# Patient Record
Sex: FEMALE | URBAN - METROPOLITAN AREA
[De-identification: names, ages, dates, MRNs, and addresses within clinical notes are randomized per-mention and may not be internally consistent; named-entity substitution may affect disease eponyms.]

---

## 2019-02-14 ENCOUNTER — TELEPHONE (OUTPATIENT)
Dept: TRANSPLANT | Facility: CLINIC | Age: 39
End: 2019-02-14

## 2019-02-14 NOTE — TELEPHONE ENCOUNTER
Jo José called and stated that she is interested in becoming a paired living donor for her , Amador José.  Medical and social history obtained.  No contraindications noted, however, patient had a baby 1 month ago. Informed that testing can not begin until she is 6 months postpartum.  All questions answered.  Education book and [facility] information emailed to patient. Instructed to contact me with any questions or when she is ready to begin testing. Patient agreed.

## 2019-05-30 ENCOUNTER — TELEPHONE (OUTPATIENT)
Dept: TRANSPLANT | Facility: CLINIC | Age: 39
End: 2019-05-30

## 2019-05-30 DIAGNOSIS — Z00.5 TRANSPLANT DONOR EVALUATION: Primary | ICD-10-CM

## 2019-05-30 NOTE — TELEPHONE ENCOUNTER
Patient called, would like to scheduled testing for paired donation in July, when her  comes for testing. Required testing discussed and information provided to schedule testing. All questions were answered and patient verbalized understanding.

## 2019-06-11 ENCOUNTER — TELEPHONE (OUTPATIENT)
Dept: TRANSPLANT | Facility: CLINIC | Age: 39
End: 2019-06-11

## 2019-07-18 ENCOUNTER — OFFICE VISIT (OUTPATIENT)
Dept: TRANSPLANT | Facility: CLINIC | Age: 39
End: 2019-07-18
Payer: COMMERCIAL

## 2019-07-18 ENCOUNTER — HOSPITAL ENCOUNTER (OUTPATIENT)
Dept: CARDIOLOGY | Facility: CLINIC | Age: 39
Discharge: HOME | End: 2019-07-18
Payer: COMMERCIAL

## 2019-07-18 VITALS
HEIGHT: 63 IN | RESPIRATION RATE: 18 BRPM | HEART RATE: 64 BPM | TEMPERATURE: 98 F | SYSTOLIC BLOOD PRESSURE: 92 MMHG | OXYGEN SATURATION: 97 % | WEIGHT: 127.44 LBS | BODY MASS INDEX: 22.58 KG/M2 | DIASTOLIC BLOOD PRESSURE: 61 MMHG

## 2019-07-18 DIAGNOSIS — Z00.5 TRANSPLANT DONOR EVALUATION: ICD-10-CM

## 2019-07-18 DIAGNOSIS — Z00.5 TRANSPLANT DONOR EVALUATION: Primary | ICD-10-CM

## 2019-07-18 DIAGNOSIS — O14.93 PRE-ECLAMPSIA IN THIRD TRIMESTER: ICD-10-CM

## 2019-07-18 DIAGNOSIS — Z00.5 WILLING TO BE KIDNEY DONOR: ICD-10-CM

## 2019-07-18 PROCEDURE — 93005 ELECTROCARDIOGRAM TRACING: CPT | Mod: S$GLB,TXP,, | Performed by: INTERNAL MEDICINE

## 2019-07-18 PROCEDURE — 93010 EKG 12-LEAD: ICD-10-PCS | Mod: S$GLB,TXP,, | Performed by: INTERNAL MEDICINE

## 2019-07-18 PROCEDURE — 99204 PR OFFICE/OUTPT VISIT, NEW, LEVL IV, 45-59 MIN: ICD-10-PCS | Mod: S$GLB,TXP,, | Performed by: TRANSPLANT SURGERY

## 2019-07-18 PROCEDURE — 93010 ELECTROCARDIOGRAM REPORT: CPT | Mod: S$GLB,TXP,, | Performed by: INTERNAL MEDICINE

## 2019-07-18 PROCEDURE — 93005 EKG 12-LEAD: ICD-10-PCS | Mod: S$GLB,TXP,, | Performed by: INTERNAL MEDICINE

## 2019-07-18 PROCEDURE — 99205 OFFICE O/P NEW HI 60 MIN: CPT | Mod: S$GLB,TXP,, | Performed by: INTERNAL MEDICINE

## 2019-07-18 PROCEDURE — 3008F BODY MASS INDEX DOCD: CPT | Mod: CPTII,S$GLB,TXP, | Performed by: INTERNAL MEDICINE

## 2019-07-18 PROCEDURE — 99999 PR PBB SHADOW E&M-EST. PATIENT-LVL IV: CPT | Mod: PBBFAC,TXP,, | Performed by: INTERNAL MEDICINE

## 2019-07-18 PROCEDURE — 3008F PR BODY MASS INDEX (BMI) DOCUMENTED: ICD-10-PCS | Mod: CPTII,S$GLB,TXP, | Performed by: INTERNAL MEDICINE

## 2019-07-18 PROCEDURE — 99205 PR OFFICE/OUTPT VISIT, NEW, LEVL V, 60-74 MIN: ICD-10-PCS | Mod: S$GLB,TXP,, | Performed by: INTERNAL MEDICINE

## 2019-07-18 PROCEDURE — 99204 OFFICE O/P NEW MOD 45 MIN: CPT | Mod: S$GLB,TXP,, | Performed by: TRANSPLANT SURGERY

## 2019-07-18 PROCEDURE — 99999 PR PBB SHADOW E&M-EST. PATIENT-LVL IV: ICD-10-PCS | Mod: PBBFAC,TXP,, | Performed by: INTERNAL MEDICINE

## 2019-07-18 NOTE — Clinical Note
July 18, 2019                     [facility] [facility]  [address]  [facility] [address]  Phone: [phone]   Patient: Jo José   MR Number: [id number]   YOB: 1980   Date of Visit: 7/18/2019       Dear      Thank you for referring Jo José to me for evaluation. Attached you will find relevant portions of my assessment and plan of care.    If you have questions, please do not hesitate to call me. I look forward to following Jo José along with you.    Sincerely,    Tami Jimenez MD    Enclosure    If you would like to receive this communication electronically, please contact [email or web address] or [phone] to request [employer] [facility] access.    [employer] [facility] is a tool which provides read-only access to select patient information with whom you have a relationship. Its easy to use and provides real time access to review your patients record including encounter summaries, notes, results, and demographic information.    If you feel you have received this communication in error or would no longer like to receive these types of communications, please e-mail [email or web address]

## 2019-07-18 NOTE — PROGRESS NOTES
Kidney Transplant Donor Evaluation    Subjective:       CC:  Initial evaluation of kidney donor candidacy.    HPI:  Ms. José is a 39 y.o. year old Unknown female who has presented to be evaluated as a potential living unrelated donor for her .  Ms. José reports being here without coercion, payment, guilt or other alternative motives other than wanting to help someone with kidney disease.     She had pre-eclampsia at 34 weeks and she was induced for vaginal delivery at 35 weeks. Denies gestational diabetes. She denies having proteinuria outside of pregnancy. Per patient BP normalized after delivery.     Father diagnosed with HTN and DM diagnosed in his 50s; controlled with diet and exercise. Denies renal disease in family except nephrolithiasis in mother. PGM diagnosed in her 40-50s.     Patient denies any history of coronary artery disease, stroke, seizure disorder, chronic obstructive pulmonary disease, liver disease, kidney stones, gallstones, deep venous thrombosis, pulmonary embolism, recurrent urinary tract infections or malignancies.    Past Medical History:   Past Medical History:   Diagnosis Date    Pre-eclampsia in third trimester     Willing to be kidney donor        Past Surgical History:   History reviewed. No pertinent surgical history.    Medications:   No current outpatient medications on file.     No current facility-administered medications for this visit.        Allergies:   Review of patient's allergies indicates:  No Known Allergies    Social History:  Social History     Socioeconomic History    Marital status:      Spouse name: Not on file    Number of children: Not on file    Years of education: Not on file    Highest education level: Not on file   Occupational History    Not on file   Social Needs    Financial resource strain: Not on file    Food insecurity:     Worry: Not on file     Inability: Not on file    Transportation needs:     Medical: Not on file      Non-medical: Not on file   Tobacco Use    Smoking status: Never Smoker    Smokeless tobacco: Never Used   Substance and Sexual Activity    Alcohol use: Not Currently     Comment: social in the past     Drug use: Never    Sexual activity: Not on file   Lifestyle    Physical activity:     Days per week: Not on file     Minutes per session: Not on file    Stress: Not on file   Relationships    Social connections:     Talks on phone: Not on file     Gets together: Not on file     Attends Spiritism service: Not on file     Active member of club or organization: Not on file     Attends meetings of clubs or organizations: Not on file     Relationship status: Not on file   Other Topics Concern    Not on file   Social History Narrative    Not on file       Family History:   Family History   Problem Relation Age of Onset    Nephrolithiasis Mother     Other Mother         amyloidosis    Diabetes Father         diagnosed in his 50s; controlled with diet and exercise    Hypertension Father         diagnosed in his 50s; controlled with diet and exercise    Hypertension Paternal Grandmother         diagnosed in her 40-50s    Hepatitis Sister         [facility]p [facility]     Lung cancer Maternal Grandfather     No Known Problems Sister     Kidney disease Neg Hx     Colon cancer Neg Hx     Breast cancer Neg Hx        Review of Systems  Constitutional: no fevers, chills, fatigue, loss of appetite, weight gain or loss, night sweats  Eyes: No dryness, redness, blurry vision, loss of vision  Ear, Nose, Throat: No hearing problems, runny nose, mouth dryness, problems swallowing, dental problems  Respiratory: No cough, shortness of breath, sputum, bloody sputum  Cardiovascular: No chest pain or palpitations  Gastrointestinal: no nausea, vomiting, diarrhea, constipation, abdominal pain, blood in stool  Genitourinary: No urinary frequency, urgency,  "incontinence, burning, pain, or bleeding; no flank pain  Skin: no rash or itching  Musculoskeletal: no arthritis or muscle pain  Neurologic: no motor weakness, numbness, tingling, or seizures  Psychiatric: no depression, mood swings, eda, or anxiety  Hematologic: +anemia during pregnancy; no easy bleeding or bruising, or blood clots  Endocrine: no thyroid problems, hot or cold intolerance, or diabetes  Immunologic: no chronic infection, hay fever, eczema    Functional History  Jo José is able to climb a flight of stairs, walk a mile, jog, and play recreational sports without any limitation or difficulty.    Health Care Maintenance  Routine follow up with PCP: doesn't have a PCP currently; saw Dr. Giselle Torres about 2 weeks ago in [place]    For Women:  Last Physical Exam: July 2018  Last Colonoscopy: N/A  Last Pap smear: 10/23/17  Last Mammogram: N/A  Last Menstrual period: May 2018 - hasn't had period return after delivery in Jan 2019  G 1 P 1    Are you pregnant or plan on becoming pregnant within the next year? no - states she is 99.9% sure she will not be having more children  How many previous pregnancies have you had? one  Have you ever had a miscarriage?   no  Have you had any complications during the pregnancy? pre-eclampsia  Any history of diabetes, hypertension, preeclampsia, or protein in the urine while pregnant? Yes - pre-eclampsia during third trimester    Objective:   Physical Exam   /62 (BP Location: Right arm, Patient Position: Sitting, BP Method: Medium (Automatic))   Pulse 71   Temp 98.2 °F (36.8 °C) (Oral)   Resp 18   Ht 5' 2.99" (1.6 m)   Wt 57.8 kg (127 lb 6.8 oz)   SpO2 97%   BMI 22.58 kg/m²     General: No acute distress, well groomed, alert and oriented x 3  HEENT: Normocephalic, atraumatic, PERRLA, sclera anicteric, conjunctiva/corneas clear, EOM's intact bilaterally, external inspection of ears and nose normal, moist mucous membranes, no oral ulcerations/lesions   Neck: " Supple, symmetrical, trachea midline, no masses, no carotid bruits, no JVD, thyroid is normal without nodules or enlargement   Respiratory: Clear to auscultation bilaterally, respirations unlabored, no rales/rhonchi/wheezing   Cardiovacular: Regular rate and rhythm, S1, S2 normal, no murmurs, no rubs or gallops   Gastrointestinal: Soft, non-tender, bowel sounds normal, no masses, no palpable organomegaly  Extremities: No clubbing or cyanosis of upper extremities bilaterally, no pedal edema bilaterally; +2 bilateral radial pulses  Skin: warm and dry; no rash on exposed skin  Lymph nodes: Cervical and supraclavicular nodes normal   Neurologic: No focal neurologic deficits.   Musculoskeletal: moves all extremities without difficulty, FROM, 5/5 strength, ambulates without an assistive device  Psychiatric: Normal mood and affect. Responds appropriately to questions.      Labs:  7/18/2019: Creatinine 0.8 mg/dL (Ref range: 0.5 - 1.4 mg/dL); BUN, Bld 17 mg/dL (Ref range: 6 - 20 mg/dL)       Assessment:     1. Transplant donor evaluation    2. Willing to be kidney donor    3. Pre-eclampsia in third trimester        Plan:   Donor Candidacy:   Based on the given information, Ms. José appears to be a suitable candidate for kidney donation.  A final recommendation will be made by the selection committee after reviewing her complete workup.    Counseled patient regarding increased risk of pre-eclampsia but she states she is 99.9% sure she will not be having more children.     Tami Jimenez MD       Counseling:   I discussed with Ms. José that donation is voluntary and reiterated it should be done willingly and for altruistic reasons only.  I reviewed that no payment should be received for donating.  I also discussed that coercion, guilt, pressure, or feelings of obligation are not appropriate reasons to donate.  The option to withdraw at any time was emphasized.  Ms. José was reminded that a medical opt out can be given to  protect her confidentiality, and no member of the transplant team will discuss specifics of her health or medical/social history with anyone else without permission.  The need for lifelong routine medical follow-up for optimal health, including routine health maintenance was reviewed.    We also discussed the long term risks associated with kidney donation.  I told the patient that her GFR should return to within 75-80% of pre-donation level within six months of donation.  I informed the patient that there is a small risk of developing albuminuria and hypertension following donor nephrectomy.  I also informed the patient that based on current literature, the risk of developing end-stage renal disease following donor nephrectomy is similar to the general population.    I reviewed with Ms. José available lab results and other diagnostics from the evaluation process    Additional Counseling:   The patient was counseled on the need for regular follow-up with a primary care physician for blood pressure and cholesterol screening.  The importance of age appropriate health screening was also emphasized.    Follow-up:   As per protocol    Altogether, 40 minutes of this encounter were spent on counseling, which was greater than 50% of the total visit time (50 minutes).

## 2019-07-18 NOTE — PROGRESS NOTES
Transplant Surgery Kidney Donor Evaluation     Referring Physician:     Subjective:     Chief Complaint: Jo José is a 39 y.o. year old female who presents today wishing to be evaluated as a potential living unrelated donor for her .    History of Present Illness:  Jo reports being here without coercion, payment, guilt, or other alternative motives other than wanting to help someone with kidney disease.    Review of Systems   Constitutional: Negative for activity change and chills.   HENT: Negative for congestion and sore throat.    Eyes: Negative for discharge and redness.   Respiratory: Negative for cough and shortness of breath.    Cardiovascular: Negative for chest pain and palpitations.   Gastrointestinal: Negative for nausea and vomiting.   Endocrine: Negative for polydipsia and polyuria.   Genitourinary: Negative for dysuria and frequency.   Musculoskeletal: Negative for arthralgias and gait problem.   Skin: Negative for pallor and rash.   Neurological: Negative for dizziness and light-headedness.   Hematological: Negative for adenopathy. Does not bruise/bleed easily.   Psychiatric/Behavioral: Negative for agitation and suicidal ideas.       Objective:   Physical Exam   Constitutional: She is oriented to person, place, and time. She appears well-developed and well-nourished. No distress.   Neck: Normal range of motion. Neck supple. No JVD present.   Cardiovascular: Normal rate and intact distal pulses.   Pulmonary/Chest: Effort normal. No respiratory distress.   Abdominal: Soft. She exhibits no distension and no mass. There is no tenderness. There is no rebound and no guarding.   Musculoskeletal: She exhibits no edema.   Neurological: She is alert and oriented to person, place, and time.   Skin: Skin is warm and dry. She is not diaphoretic.   Psychiatric: She has a normal mood and affect.     ABO type:     Diagnoses:  1. Transplant donor evaluation             Transplant Surgery - Candidacy    Assessment/Plan:     Donor Candidacy: Based on information available thus far, Jo is a suitable candidate for living kidney donation.    Laureano Cox MD       Education: I discussed with the patient the requirements for donation including the compatibility of blood and tissue typing, healthy by physical examination and workup, as well as the desire to donate.  We discussed the risks related to surgery including the risks related to anesthesia, bleeding, infection, inability for surgery to be performed laparoscopically, risks of reoperation as well as the risks of death.  We discussed the length of hospitalization, return to work times, as well as follow-up post-donation.    I also discussed the slight possibility that due to problems with the recipient operation, the transplant might not be able to be completed after the organ was already removed. If such a situation should arise, the donor prefers that the organ be transplanted into a suitable third-party recipient.    I discussed the possibility that living donor sometimes encounter problems obtaining health insurance, or could have higher premium despite ongoing efforts of transplant professionals to educate insurance companies on this issue.    I discussed with Jo that donation is a voluntary activity, and reiterated it should be done willingly and for altruistic reasons only.  I reviewed that no payment should be made for donating.  I also discussed that coersion, guilt, pressure, or feelings of obligation are not appropriate reasons to donate.  The option to withdraw at any time was emphasized.  Jo was reminded that a medical opt out can be given to protect her confidentiality, and no member of the transplant team will discuss specifics of her health or medical/social history with anyone else without permission.  The need for lifelong routine medical follow-up for optimal health, including routine health maintenance was reviewed.    Additionally, I  discussed the need for our program to be able to contact living donors for UNOS reporting purposes for a minimum of 2 years.  Failure of our center to be able to provide such information could jeopardize our ability to continue to offer living donor transplants.  Jo voices understanding and agrees to this follow-up.    I discussed the UNOS requirement for centers to report donor status for a minimum of two years. Jo understands that failure to comply with requirement could have adverse consequences for our transplant program, and agrees to cooperate with all our required follow-up.    I reviewed with Jo available lab results and other diagnostics from the evaluation process.

## 2019-07-18 NOTE — PROGRESS NOTES
Pt is here today for kidney donor evaluation.  Labs, hx, and BMI reviewed.  No nutrition intervention required at this time.

## 2019-07-18 NOTE — PROGRESS NOTES
"DONOR TEACHING NOTE    Met with Jo José today in clinic to review living donor education.        Topics covered included:  · Kidney donation is done voluntarily and of the donor's free will.  Process can stop at any time.   · Better success rates than cadaveric donation, shorter waiting time for the recipient: less than the 3-5 year wait on the list, more time to prepare: tests/surgery can be planned  · Laboratory studies of blood and urine.  Health exams: records of GYN/pap/mammogram (females); evaluation by transplant team and a psychiatrist (if indicated); diagnostic Tests: CXR, EKG, TB skin test, 24-hour urine collection, renal scan, CT of abdomen to assess kidney anatomy, and stress test (if indicated)  · Team will review workup for approval.  Copy of the approved criteria for donation given to patient.  Surgeon will decide which kidney will be donated.   · Benefits of laparoscopic nephrectomy: less pain, shorter hospital stay, a shorter recovery period.  Risks discussed: bleeding, deep vein thrombosis, pulmonary embolus, the need for re-operation.  Your operation may be converted to an "open" procedure if the surgeon feels it is medically necessary.  · To recovery room, transfer to TSU, if space allows or semi-private room.  Menendez catheter/IV, average hospital stay is 1 day.  At discharge the cost of any prescriptions is the donor's responsibility.  · Post-operative visit 4 weeks after surgery or prior to returning to work, unless a complication arises and you need to be seen sooner.  Off of work for about 3 to 6 weeks, no driving for at least the first 3 weeks.  General surgical complications: infection, allergic reaction, and anesthesia.   · Long life considerations of living with one kidney: risk of HTN and kidney failure   · Following up with PCP 6 months after donation then yearly thereafter to monitor kidney function.  This should include weight, labs, urinalysis, and a blood pressure check.  We are " required to report your progress to UNOS at 6 months, 1 year, and 2 years post-donation.     Written educational material provided. Informed consent reviewed and signed, including paired kidney donation. All questions were answered and patient verbalized understanding.     Patient is a suitable candidate for living kidney donation.

## 2019-07-18 NOTE — PROGRESS NOTES
TRANSPLANT DONOR PSYCHOSOCIAL ASSESSMENT    Jo José  [address] [place] [address]  Telephone Information:   Mobile [phone]     Home [phone] (home)  Work  There is no work phone number on file.  E-mail  [email or web address]    Pt has currently been staying with family in [facility] since the birth of her baby 6 months ago.   (intended recipient) is working in [place].    PHS Increased Risk Behavioral Questionnaire reviewed by : Yes   addressed any PHS increased risk behaviors or concerns. Resources and education provided as appropriate.    Sex: female  YOB: 1980  Age: 39 y.o.    Encounter Date: 7/18/2019  U.[place]. Citizen: no  Primary Language: English   Needed: no    Potential Recipient: Amador José  Clinic Number: [id number]  Donor's Relationship to Patient: spouse    Emergency Contact:  Name: Berry Chad  Relationship: parents  Address: [facility]  Phone Numbers:  n/a (home), n/a (work), [phone] (dad), [phone] (mom) (mobile)    Family/Social Support:   Number of dependents/: one six month old son  Marital history:  once for the past 7 years  Other family dynamics: Parents living and supportive; two sisters, all of whom live in [place]    Household Composition:  Name: Felix José  Age: 39 and 42  Relationship: patient and   Does person drive? yes    Name: Amador Bonilla Arnav  Age: 6 mos  Relationship: son  Does person drive? no    Do you and your caregivers have access to reliable transportation? yes  PRIMARY CAREGIVER: Janneth marii Barrie Bonilla, parents will be primary caregiver, phone number [phone].  Mother, Janneth, does not drive.      provided in-depth information to patient and caregiver regarding pre- and post-transplant caregiver role.   strongly encourages patient and caregiver to have concrete plan regarding  post-transplant care giving, including back-up caregiver(s) to ensure care giving needs are met as needed.    Patient states understanding all aspects of caregiver role/commitment and is able/willing/committed to being caregiver to the fullest extent necessary.    Patient verbalizes understanding of the education provided today and caregiver responsibilities.         remains available. Patient agree to contact  in a timely manner if concerns arise.      Able to take time off work without financial concerns: yes. 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      Additional Significant Others who will Assist with Transplant:  Name: Amber José  Age: 71  City: [place] State: [place]  Relationship: mother in law  Does person drive? yes      Living Will: no  Healthcare Power of : no  Advance Directives on file: <no information> per medical record.  Verbally reviewed LW/HCPA information.   provided patient with copy of LW/HCPA documents and provided education on completion of forms.    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    Fundraising and NLDAC information provided to patient.  Patient verbalizes understanding.   remains available.    Spouse/Significant Other Employment: Pt's  is an MD and practices Emergency Medicine currently in [place].    Insurance: see potential recipient's insurance for donor coverage.    Does the donor have health insurance? Yes    Patient verbalizes clear understanding that patient may experience difficulty obtaining and/or be denied insurance coverages post-surgery.  This includes and is not limited to disability insurance, life insurance, health insurance, burial insurance, long term care insurance, and other  insurances.  Patient also reports understanding that future health concerns related to or unrelated to transplantation may not be covered by patient's insurance.  Resources and information provided and reviewed.      Patient provides verbal permission to release any necessary information to outside resources for patient care and discharge planning.  Resources and information provided and reviewed.      Infusion Service: patient utilizing? no  Home Health: patient utilizing? no  DME: no  ADLS:  Pt states ability to independently accomplish all adls.    Adherence:   Pt states current and expected compliance with all healthcare recommendations.   Adherence education and counseling provided    Per History Section:  Past Medical History:   Diagnosis Date    Pre-eclampsia in third trimester     Willing to be kidney donor      Social History     Tobacco Use    Smoking status: Never Smoker    Smokeless tobacco: Never Used   Substance Use Topics    Alcohol use: Not Currently     Comment: social in the past      Social History     Substance and Sexual Activity   Drug Use Never     Social History     Substance and Sexual Activity   Sexual Activity Not on file       Per Today's Psychosocial:  Tobacco: none, patient denies any use.  Alcohol: none, patient denies any use.  Illicit Drugs/Non-prescribed Medications: none, patient denies any use.    Patient states clear understanding of the potential impact of substance use as it relates to donor candidacy and is agreeable to random substance screening.  Substance abstinence/cessation counseling, education and resources provided and reviewed.     Arrests/DWI/Treatment/Rehab: patient denies    Psychiatric History:    Mental Health: Pt denies mental health concerns  Psychiatrist/Counselor: none  Medications:  none  Suicide/Homicide Issues: Pt denies current or past suicidal/homicidal ideation.   Safety at home: Pt denies any home safety concerns.    Donation  Knowledge/Expectations: Patient states having clear understanding and realistic expectations regarding the potential risks and potential benefits of organ transplantation and organ donation and agrees to discuss with health care team members and support system members, as well as to utilize available resources and express questions and/or concerns in order to further facilitate the patients informed decision-making.  Resources and information provided and reviewed. .    Decision-making Process: Pt stated she (naturally) would like to keep her  healthy and off of dialysis if possible and knows that donation will increase his chances of being transplanted before he has to start dialysis.  She stated he has been listed two years and his CKD has progressed to stage 5.  Even though he has no access yet, wife stated she would like to get pt transplanted sooner than that so that he can be there for her and their son.  She wants to be in the paired organ exchange program as she is not a match.  Patient states understanding that transplant and donation are not a guarantee that the donated organ will function.  Patient states understanding of kidney treatment options available for kidney patients, psychosocial aspects surrounding organ donation and transplantation as well as recovery.  Patient also states clear understanding that patient may choose to not donate at any time prior to surgery taking place, and that patient confidentiality is protected.  Patient reports expected compliance with health care regime and states understanding of importance of compliance.  Educational information provided.    Patient reports having a clear understanding  that risks and benefits may be involved with organ transplantation and with organ donation and  of the treatment options available to a potential transplant recipient. Patient reports clear understanding that psychosocial risk factors which may affect patient, including but  not limited to feelings of depression, generalized anxiety, anxiety regarding dependence on others, post traumatic stress disorder, feelings of guilt and other emotional and/or mental concerns, and/or exacerbation of existing mental health concerns.     Detailed resources and education provided and discussed. Patient agrees to access appropriate resources in a timely manner as needed and to communicate concerns appropriately.      Feelings or Concerns: Pt verbalizes no concerns at this time. Patient denies feelings of coercion, pressure or obligation to donate.  She states she is placing no undue pressure on herself to donate.  Patient states that patient is not receiving any compensation for organ donation. Patient reports motivation to pursue organ donation at this time.     Patient reports having clear and realistic expectations and understanding of the many psychosocial aspects involved with being a living organ donor, including but not limited to costs, compliance, medications, lab work, procedures, appointments, financial planning, preparedness, timely and appropriate communication of concerns, abstinence from non-prescribed drugs or substances, importance of adherence to and follow-through with all health care team recommendations, participation in health care and treatment planning, utilization of resources and follow-through, mental health counseling as needed and/or recommended, and the patient and caregiver responsibilities.  Patient states having a clear understanding of possible difficulty obtaining or possibility of being denied insurance coverage post-surgery.  This includes and is not limited to disability insurance, life insurance, health insurance, burial insurance, long-term care insurance and other insurances.  Educational and resource information provided and reviewed.  Patient also reports understanding that future health concerns related to or unrelated to organ donation may not be covered by  patients insurance.    Coping:  Pt stated she sarath through spending time with family and new baby, going out to dinner and theater as well as traveling with .     Interview Behavior: Jo presents as alert and oriented x 4, pleasant, good eye contact, well groomed, recall good, concentration/judgement good, average intelligence, calm, communicative, cooperative and asking and answering questions appropriately.  Pt presented as very well informed and open to all education.  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  She presented with  (also intended recipient) who exited the room for sensitive areas of interview.  Pt's caregivers are her parents who remained in [place], but will be available for pt when surgery is scheduled.      Suitability for Donation: Jo José presents as an acceptable candidate for donation at this time.    Recommendations/Additional Comments: Pt and  are currently staying at a local [facility] and plan to utilize same when transplant takes place.  She stated they are able to afford costs associated with transplant.  They have childcare arranged with family and care for pets as well.  No further needs identified at this time.

## 2019-07-18 NOTE — PATIENT INSTRUCTIONS
1. Avoid excess use of ibuprofen, naproxen, Motrin (NSAIDs), protein consumption, iodinated contrast dye  2. Stay active   3. Follow-up with your PCP

## 2019-07-19 ENCOUNTER — HOSPITAL ENCOUNTER (OUTPATIENT)
Dept: RADIOLOGY | Facility: HOSPITAL | Age: 39
Discharge: HOME | End: 2019-07-19
Attending: NURSE PRACTITIONER
Payer: COMMERCIAL

## 2019-07-19 ENCOUNTER — HOSPITAL ENCOUNTER (OUTPATIENT)
Dept: RADIOLOGY | Facility: HOSPITAL | Age: 39
Discharge: HOME | End: 2019-07-19
Attending: TRANSPLANT SURGERY
Payer: COMMERCIAL

## 2019-07-19 DIAGNOSIS — Z00.5 TRANSPLANT DONOR EVALUATION: ICD-10-CM

## 2019-07-19 PROCEDURE — 74174 CTA ABD&PLVS W/CONTRAST: CPT | Mod: 26,TXP,, | Performed by: RADIOLOGY

## 2019-07-19 PROCEDURE — 74174 CTA ABD&PLVS W/CONTRAST: CPT | Mod: TC,TXP

## 2019-07-19 PROCEDURE — 74174 CTA ABDOMEN AND PELVIS: ICD-10-PCS | Mod: 26,TXP,, | Performed by: RADIOLOGY

## 2019-07-19 PROCEDURE — 71046 XR CHEST PA AND LATERAL: ICD-10-PCS | Mod: 26,TXP,, | Performed by: RADIOLOGY

## 2019-07-19 PROCEDURE — 71046 X-RAY EXAM CHEST 2 VIEWS: CPT | Mod: 26,TXP,, | Performed by: RADIOLOGY

## 2019-07-19 PROCEDURE — 71046 X-RAY EXAM CHEST 2 VIEWS: CPT | Mod: TC,TXP

## 2019-07-19 PROCEDURE — 25500020 PHARM REV CODE 255: Mod: TXP | Performed by: TRANSPLANT SURGERY

## 2019-07-19 RX ADMIN — IOHEXOL 125 ML: 350 INJECTION, SOLUTION INTRAVENOUS at 01:07

## 2019-07-31 ENCOUNTER — TELEPHONE (OUTPATIENT)
Dept: TRANSPLANT | Facility: CLINIC | Age: 39
End: 2019-07-31

## 2019-10-07 ENCOUNTER — TELEPHONE (OUTPATIENT)
Dept: TRANSPLANT | Facility: CLINIC | Age: 39
End: 2019-10-07

## 2019-10-07 NOTE — TELEPHONE ENCOUNTER
Patient emailed that she is back stateside. Requesting supplies for repeat urine testing be sent to new address. Supplies sent with lab order.

## 2019-11-23 LAB
ALBUMIN/CREAT UR: <21.6 MG/G CREAT (ref 0–30)
APPEARANCE UR: CLEAR
BACTERIA UR CULT: NO GROWTH
BACTERIA UR CULT: NORMAL
BILIRUB UR QL STRIP: NEGATIVE
COLOR UR: YELLOW
CREAT 24H UR-MRATE: 445 MG/24 HR (ref 800–1800)
CREAT CL 24H UR+SERPL-VRATE: 40 ML/MIN (ref 88–128)
CREAT SERPL-MCNC: 0.77 MG/DL (ref 0.57–1)
CREAT UR-MCNC: 13.9 MG/DL
GLUCOSE UR QL: NEGATIVE
H CAPSUL AG UR QL IA: <0.5
HGB UR QL STRIP: NEGATIVE
KETONES UR QL STRIP: NEGATIVE
LEUKOCYTE ESTERASE UR QL STRIP: NEGATIVE
Lab: NORMAL
MICRO URNS: NORMAL
MICROALBUMIN UR-MCNC: <3 UG/ML
NITRITE UR QL STRIP: NEGATIVE
PH UR STRIP: 6 [PH] (ref 5–7.5)
PROT 24H UR-MRATE: 198 MG/24 HR (ref 30–150)
PROT UR QL STRIP: NEGATIVE
PROT UR-MCNC: 6.2 MG/DL
SP GR UR: 1.01 (ref 1–1.03)
UROBILINOGEN UR STRIP-MCNC: 0.2 MG/DL (ref 0.2–1)

## 2019-12-05 ENCOUNTER — TELEPHONE (OUTPATIENT)
Dept: TRANSPLANT | Facility: CLINIC | Age: 39
End: 2019-12-05

## 2019-12-05 NOTE — TELEPHONE ENCOUNTER
Results of repeat urine testing reviewed with Dr. Luis. Will repeat 24 hour urine collection due to increased protein and obtain a cystatin C.     Patient notified of test results, states she was not having her menstrual cycle and did not exercise heavily prior to last testing. Orders for repeat testing emailed to patient. She will obtain supplies locally.   Reviewed proper collection technique for 24 hour urine and CCMS samples. Patient instructed to assure specimen containers labeled correctly with their information prior to beginning collection. All questions were answered and patient verbalized understanding.

## 2019-12-21 LAB
PROT 24H UR-MRATE: 83 MG/24 HR (ref 30–150)
PROT UR-MCNC: 5.5 MG/DL

## 2019-12-23 NOTE — PROGRESS NOTES
I see two different collections both with unacceptable CrCl.  Let's review this with the clinic nephrologist in [place]

## 2019-12-24 LAB
ALBUMIN/CREAT UR: 7.3 MG/G CREAT (ref 0–30)
CREAT SERPL-MCNC: 0.68 MG/DL (ref 0.57–1)
CREAT UR-MCNC: 71 MG/DL
CYSTATIN C SERPL-MCNC: 0.75 MG/L (ref 0.62–1.16)
MICROALBUMIN UR-MCNC: 5.2 UG/ML

## 2020-02-06 ENCOUNTER — DOCUMENTATION ONLY (OUTPATIENT)
Dept: TRANSPLANT | Facility: CLINIC | Age: 40
End: 2020-02-06

## 2020-02-06 NOTE — NURSING
PHARMEmilyD. PRE-TRANSPLANT DONOR NOTE:    This patient's medication therapy was evaluated as part of her pre-transplant evaluation.      The following general pharmacologic concerns were noted: none    No current outpatient medications on file.     No current facility-administered medications for this visit.          Currently she is responsible for preparing / administering this patient's medications on a daily basis.  I am available for consultation and can be contacted, as needed by the other members of the Kidney Transplant team.

## 2020-02-07 ENCOUNTER — COMMITTEE REVIEW (OUTPATIENT)
Dept: TRANSPLANT | Facility: CLINIC | Age: 40
End: 2020-02-07

## 2020-02-07 DIAGNOSIS — Z00.5 TRANSPLANT DONOR EVALUATION: Primary | ICD-10-CM

## 2020-02-07 NOTE — COMMITTEE REVIEW
Jo José was presented at selection committee on 2/7/20 . Patient did meet selection criteria for living kidney donation pending nuclear medicine GFR to confirm renal function.     CT scan reviewed, will use left kidney for donation.    Attempted to contact patient, no answer, no voicemail. E mail sent to patient to call for committee decision.     Note written by Audrey Carvajal RN    ===============================================================  I was present at the meeting and attest to the decision of the committee

## 2020-02-10 ENCOUNTER — TELEPHONE (OUTPATIENT)
Dept: TRANSPLANT | Facility: CLINIC | Age: 40
End: 2020-02-10

## 2020-02-10 NOTE — TELEPHONE ENCOUNTER
Patient called, informed of committee decision. Patient will contact me to schedule nuclear medicine GFR after checking her schedule. Also asked patient to have recent gyn records faxed to us for review.

## 2020-02-20 ENCOUNTER — HOSPITAL ENCOUNTER (OUTPATIENT)
Dept: RADIOLOGY | Facility: HOSPITAL | Age: 40
Discharge: HOME | End: 2020-02-20
Attending: INTERNAL MEDICINE
Payer: COMMERCIAL

## 2020-02-20 DIAGNOSIS — Z00.5 TRANSPLANT DONOR EVALUATION: ICD-10-CM

## 2020-02-20 PROCEDURE — 78701 KIDNEY IMAGING WITH FLOW: CPT | Mod: 26,TXP,, | Performed by: RADIOLOGY

## 2020-02-20 PROCEDURE — 78701 NM KIDNEY IMAGING MORPH W VASCULAR: ICD-10-PCS | Mod: 26,TXP,, | Performed by: RADIOLOGY

## 2020-02-20 PROCEDURE — A9567 TECHNETIUM TC-99M AEROSOL: HCPCS | Mod: TXP

## 2020-02-20 PROCEDURE — 78701 KIDNEY IMAGING WITH FLOW: CPT | Mod: TC,TXP

## 2020-02-21 ENCOUNTER — COMMITTEE REVIEW (OUTPATIENT)
Dept: TRANSPLANT | Facility: CLINIC | Age: 40
End: 2020-02-21

## 2020-02-21 NOTE — COMMITTEE REVIEW
Results of recent nuclear medicine GFR were reviewed at selection committee meeting today. Although patient's GFR does meet selection criteria for living kidney donation, it is slightly low for patient's age and small amount of proteinuria is present. Dr. Jimenez will review the results with the patient and discuss future risks. If acceptable with patient, we will proceed with paired exchange.     This writer contacted the patient and discussed results of scaled nuclear GFR and the discussion held at selection committee. Explained that typically 80 mL/min is the cut off for donor candidacy but this is weighed against the potential donor's age and other medical history. Her scaled nuclear GFR for BSA was 87.27 mL/min but she is only 39 years old at this time. She does have a small body frame thus there was concern regarding inaccuracy of CrCl as measured by 24 hour urine collection. Cystatin C GFR was acceptable. Patient voiced that she understands the results of the testing and the risks of donation and still wants to proceed. She stated that she is not planning on having any more children. Counseled her and her  regarding how they should still seek other potential living donors who may potentially be able to donate directly to the recipient. Also explained that due to enrolling into paired exchange program the other transplant center may look at donor-recipient size matching to optimize chances of successful transplant outcome which may lead to a lengthier time in the paired exchange process. She thanked this writer for lora the time to go over the results and answer their questions.       Note written by Audrey Carvajal RN    ===============================================================  I was present at the meeting and attest to the decision of the committee. My additional comments are bolded above.    Tami Jimenez MD

## 2020-02-28 ENCOUNTER — COMMITTEE REVIEW (OUTPATIENT)
Dept: TRANSPLANT | Facility: CLINIC | Age: 40
End: 2020-02-28

## 2020-02-28 NOTE — COMMITTEE REVIEW
Jo José was discussed at selection committee this morning. Dr Jimenez reviewed conversation with patient. Patient understands risks and would like to proceed with donation. Patient approved for living kidney donation.    Patient notified of committee decision. Informed that we will enter them into the paired exchange program and they will be notified when they are matched. All questions were answered and patient verbalized understanding.     Note written by Audrey Carvajal RN    ===============================================================  I was present at the meeting and attest to the decision of the committee

## 2020-03-25 ENCOUNTER — TELEPHONE (OUTPATIENT)
Dept: TRANSPLANT | Facility: CLINIC | Age: 40
End: 2020-03-25

## 2020-03-25 NOTE — TELEPHONE ENCOUNTER
----- Message from Clarke Lopez MD sent at 3/25/2020  9:01 AM CDT -----  Regarding: RE: donor CT  Left donor  ----- Message -----  From: Audrey Carvajal  Sent: 2/28/2020  11:18 AM CDT  To: Clarke Lopez MD  Subject: donor CT                                         Review donor CT

## 2020-08-26 ENCOUNTER — TELEPHONE (OUTPATIENT)
Dept: TRANSPLANT | Facility: CLINIC | Age: 40
End: 2020-08-26

## 2020-08-26 NOTE — TELEPHONE ENCOUNTER
Spoke with pt. She is able to proceed with surgery in the end of September; beginning of October time frame. Informed her that she will receive a CXM kit in the mail from the recipient center in [place] and to notify us once she receives it.   Encouraged to call for any further questions or concerns.

## 2020-09-08 ENCOUNTER — TELEPHONE (OUTPATIENT)
Dept: TRANSPLANT | Facility: CLINIC | Age: 40
End: 2020-09-08

## 2020-09-14 ENCOUNTER — TELEPHONE (OUTPATIENT)
Dept: TRANSPLANT | Facility: CLINIC | Age: 40
End: 2020-09-14

## 2020-09-15 DIAGNOSIS — Z00.5 TRANSPLANT DONOR EVALUATION: Primary | ICD-10-CM

## 2020-09-15 DIAGNOSIS — Z00.5 EXAMINATION OF POTENTIAL DONOR OF ORGAN AND TISSUE: ICD-10-CM

## 2020-09-15 DIAGNOSIS — Z01.818 PRE-OP EVALUATION: ICD-10-CM

## 2020-09-22 ENCOUNTER — CLINICAL SUPPORT (OUTPATIENT)
Dept: TRANSPLANT | Facility: CLINIC | Age: 40
End: 2020-09-22
Payer: COMMERCIAL

## 2020-09-22 ENCOUNTER — TELEPHONE (OUTPATIENT)
Dept: PREADMISSION TESTING | Facility: HOSPITAL | Age: 40
End: 2020-09-22

## 2020-09-22 ENCOUNTER — OFFICE VISIT (OUTPATIENT)
Dept: TRANSPLANT | Facility: CLINIC | Age: 40
End: 2020-09-22
Payer: COMMERCIAL

## 2020-09-22 ENCOUNTER — CLINICAL SUPPORT (OUTPATIENT)
Dept: TRANSPLANT | Facility: CLINIC | Age: 40
End: 2020-09-22

## 2020-09-22 ENCOUNTER — HOSPITAL ENCOUNTER (OUTPATIENT)
Dept: RADIOLOGY | Facility: HOSPITAL | Age: 40
Discharge: HOME | End: 2020-09-22
Attending: NURSE PRACTITIONER
Payer: COMMERCIAL

## 2020-09-22 ENCOUNTER — HOSPITAL ENCOUNTER (OUTPATIENT)
Dept: CARDIOLOGY | Facility: CLINIC | Age: 40
Discharge: HOME | End: 2020-09-22
Payer: COMMERCIAL

## 2020-09-22 VITALS
HEART RATE: 72 BPM | BODY MASS INDEX: 25.27 KG/M2 | TEMPERATURE: 97 F | HEART RATE: 72 BPM | SYSTOLIC BLOOD PRESSURE: 116 MMHG | WEIGHT: 142.63 LBS | HEIGHT: 63 IN | HEIGHT: 63 IN | BODY MASS INDEX: 25.27 KG/M2 | WEIGHT: 142.63 LBS | OXYGEN SATURATION: 97 % | TEMPERATURE: 97 F | OXYGEN SATURATION: 97 % | OXYGEN SATURATION: 97 % | DIASTOLIC BLOOD PRESSURE: 76 MMHG | DIASTOLIC BLOOD PRESSURE: 76 MMHG | TEMPERATURE: 97 F | BODY MASS INDEX: 25.27 KG/M2 | RESPIRATION RATE: 18 BRPM | SYSTOLIC BLOOD PRESSURE: 116 MMHG | HEIGHT: 63 IN | WEIGHT: 142.63 LBS | RESPIRATION RATE: 18 BRPM | RESPIRATION RATE: 18 BRPM | HEART RATE: 72 BPM | SYSTOLIC BLOOD PRESSURE: 116 MMHG | DIASTOLIC BLOOD PRESSURE: 76 MMHG

## 2020-09-22 VITALS
BODY MASS INDEX: 25.27 KG/M2 | SYSTOLIC BLOOD PRESSURE: 116 MMHG | WEIGHT: 142.63 LBS | HEART RATE: 72 BPM | TEMPERATURE: 97 F | OXYGEN SATURATION: 97 % | DIASTOLIC BLOOD PRESSURE: 76 MMHG | HEIGHT: 63 IN | RESPIRATION RATE: 18 BRPM

## 2020-09-22 DIAGNOSIS — Z52.4 DONOR OF KIDNEY FOR TRANSPLANT: ICD-10-CM

## 2020-09-22 DIAGNOSIS — Z00.5 TRANSPLANT DONOR EVALUATION: ICD-10-CM

## 2020-09-22 DIAGNOSIS — Z00.5 WILLING TO BE KIDNEY DONOR: Primary | ICD-10-CM

## 2020-09-22 PROCEDURE — 3008F PR BODY MASS INDEX (BMI) DOCUMENTED: ICD-10-PCS | Mod: CPTII,S$GLB,TXP, | Performed by: NURSE PRACTITIONER

## 2020-09-22 PROCEDURE — 93010 ELECTROCARDIOGRAM REPORT: CPT | Mod: S$GLB,TXP,, | Performed by: INTERNAL MEDICINE

## 2020-09-22 PROCEDURE — 3008F BODY MASS INDEX DOCD: CPT | Mod: CPTII,S$GLB,TXP,ICN | Performed by: TRANSPLANT SURGERY

## 2020-09-22 PROCEDURE — 93010 EKG 12-LEAD: ICD-10-PCS | Mod: S$GLB,TXP,, | Performed by: INTERNAL MEDICINE

## 2020-09-22 PROCEDURE — 99214 OFFICE O/P EST MOD 30 MIN: CPT | Mod: S$GLB,TXP,ICN, | Performed by: TRANSPLANT SURGERY

## 2020-09-22 PROCEDURE — 71046 X-RAY EXAM CHEST 2 VIEWS: CPT | Mod: 26,TXP,, | Performed by: RADIOLOGY

## 2020-09-22 PROCEDURE — 3074F SYST BP LT 130 MM HG: CPT | Mod: CPTII,S$GLB,TXP,ICN | Performed by: TRANSPLANT SURGERY

## 2020-09-22 PROCEDURE — 3078F DIAST BP <80 MM HG: CPT | Mod: CPTII,S$GLB,TXP,ICN | Performed by: TRANSPLANT SURGERY

## 2020-09-22 PROCEDURE — 99214 PR OFFICE/OUTPT VISIT, EST, LEVL IV, 30-39 MIN: ICD-10-PCS | Mod: S$GLB,TXP,ICN, | Performed by: TRANSPLANT SURGERY

## 2020-09-22 PROCEDURE — 3074F PR MOST RECENT SYSTOLIC BLOOD PRESSURE < 130 MM HG: ICD-10-PCS | Mod: CPTII,S$GLB,TXP,ICN | Performed by: TRANSPLANT SURGERY

## 2020-09-22 PROCEDURE — 3074F SYST BP LT 130 MM HG: CPT | Mod: CPTII,S$GLB,TXP, | Performed by: NURSE PRACTITIONER

## 2020-09-22 PROCEDURE — 3008F BODY MASS INDEX DOCD: CPT | Mod: CPTII,S$GLB,TXP, | Performed by: NURSE PRACTITIONER

## 2020-09-22 PROCEDURE — 99999 PR PBB SHADOW E&M-EST. PATIENT-LVL III: ICD-10-PCS | Mod: PBBFAC,TXP,, | Performed by: NURSE PRACTITIONER

## 2020-09-22 PROCEDURE — 99999 PR PBB SHADOW E&M-EST. PATIENT-LVL III: ICD-10-PCS | Mod: PBBFAC,TXP,,

## 2020-09-22 PROCEDURE — 99215 PR OFFICE/OUTPT VISIT, EST, LEVL V, 40-54 MIN: ICD-10-PCS | Mod: S$GLB,TXP,, | Performed by: NURSE PRACTITIONER

## 2020-09-22 PROCEDURE — 3078F DIAST BP <80 MM HG: CPT | Mod: CPTII,S$GLB,TXP, | Performed by: NURSE PRACTITIONER

## 2020-09-22 PROCEDURE — 99999 PR PBB SHADOW E&M-EST. PATIENT-LVL III: CPT | Mod: PBBFAC,TXP,,

## 2020-09-22 PROCEDURE — 93005 EKG 12-LEAD: ICD-10-PCS | Mod: S$GLB,TXP,, | Performed by: NURSE PRACTITIONER

## 2020-09-22 PROCEDURE — 99215 OFFICE O/P EST HI 40 MIN: CPT | Mod: S$GLB,TXP,, | Performed by: NURSE PRACTITIONER

## 2020-09-22 PROCEDURE — 71046 X-RAY EXAM CHEST 2 VIEWS: CPT | Mod: TC,FY,TXP

## 2020-09-22 PROCEDURE — 71046 XR CHEST PA AND LATERAL: ICD-10-PCS | Mod: 26,TXP,, | Performed by: RADIOLOGY

## 2020-09-22 PROCEDURE — 93005 ELECTROCARDIOGRAM TRACING: CPT | Mod: S$GLB,TXP,, | Performed by: NURSE PRACTITIONER

## 2020-09-22 PROCEDURE — 3078F PR MOST RECENT DIASTOLIC BLOOD PRESSURE < 80 MM HG: ICD-10-PCS | Mod: CPTII,S$GLB,TXP,ICN | Performed by: TRANSPLANT SURGERY

## 2020-09-22 PROCEDURE — 3078F PR MOST RECENT DIASTOLIC BLOOD PRESSURE < 80 MM HG: ICD-10-PCS | Mod: CPTII,S$GLB,TXP, | Performed by: NURSE PRACTITIONER

## 2020-09-22 PROCEDURE — 3074F PR MOST RECENT SYSTOLIC BLOOD PRESSURE < 130 MM HG: ICD-10-PCS | Mod: CPTII,S$GLB,TXP, | Performed by: NURSE PRACTITIONER

## 2020-09-22 PROCEDURE — 3008F PR BODY MASS INDEX (BMI) DOCUMENTED: ICD-10-PCS | Mod: CPTII,S$GLB,TXP,ICN | Performed by: TRANSPLANT SURGERY

## 2020-09-22 PROCEDURE — 99999 PR PBB SHADOW E&M-EST. PATIENT-LVL III: CPT | Mod: PBBFAC,TXP,, | Performed by: NURSE PRACTITIONER

## 2020-09-22 RX ORDER — HEPARIN SODIUM 5000 [USP'U]/ML
5000 INJECTION, SOLUTION INTRAVENOUS; SUBCUTANEOUS
Status: CANCELLED | OUTPATIENT
Start: 2020-09-22

## 2020-09-22 NOTE — TELEPHONE ENCOUNTER
----- Message from Hannah Tolbert RN sent at 9/21/2020  3:35 PM CDT -----  Regarding: RE: PAT appt needed for living donor  Yes please, I will let her know tomorrow. Thanks!  ----- Message -----  From: Ally Olivera  Sent: 9/21/2020   2:29 PM CDT  To: Hannah Tolbert RN  Subject: RE: PAT appt needed for living donor             Can she come on the 24 of this month I try calling the pt to set up appt no answer do you want me to hanna the appt anyway  ----- Message -----  From: Hannah Tolbert RN  Sent: 9/21/2020   2:10 PM CDT  To: [facility] (Pat) Scheduling  Subject: PAT appt needed for living donor                 Hi,    This patient is coming for pre op tomorrow in transplant clinic. Do you have any availability for her to be seen this week in clinic?  Her surgery is 10/5/2020 at 0700.     Thanks,    Hannah

## 2020-09-22 NOTE — PROGRESS NOTES
PRE-OP TEACHING NOTE    Joeugene José is here today for pre-op appointments.  Pre-op instructions reviewed and written information was provided.  All questions were answered.  Discussed possibility that surgery may be rescheduled if the program is busy with  donor transplants.  Pt will have her Anesthesia appt for 10/2/20 once she arrives in [facility] for surgery.  COVID-19 testing scheduled for 10/2/20 per protocol.   Patient agreed and verbalized understanding of all instructions.

## 2020-09-22 NOTE — PROGRESS NOTES
Transplant Surgery Kidney Donor Preoperative Evaluation    Subjective:   CC:  Preoperative evaluation before donor nephrectomy.    HPI:  Ms. José is a 40 y.o. year old Unknown female who has been approved to be a living unrelated donor for .  She denies any changes in her health condition since her previous visit.      Past Medical History:   Diagnosis Date    Pre-eclampsia in third trimester     Willing to be kidney donor      No past surgical history on file.  Review of patient's allergies indicates:  No Known Allergies  Review of Systems   Constitutional: Negative for activity change, appetite change, chills and fever.   HENT: Negative for congestion, nosebleeds, sinus pressure, sore throat and voice change.    Eyes: Negative for pain and visual disturbance.   Respiratory: Negative for cough and shortness of breath.    Cardiovascular: Negative for palpitations and leg swelling.   Gastrointestinal: Negative for abdominal distention, abdominal pain, diarrhea, nausea and vomiting.   Endocrine: Negative for cold intolerance and heat intolerance.   Genitourinary: Negative for dysuria, flank pain, frequency and hematuria.   Musculoskeletal: Negative for back pain and gait problem.   Skin: Negative for color change, pallor and wound.   Allergic/Immunologic: Negative for food allergies.   Neurological: Negative for dizziness, seizures, numbness and headaches.   Hematological: Negative for adenopathy.   Psychiatric/Behavioral: Negative for agitation, behavioral problems, hallucinations and sleep disturbance.       Objective:   There were no vitals taken for this visit.  Physical Exam  Constitutional:       Appearance: She is well-developed.   HENT:      Head: Normocephalic and atraumatic.   Eyes:      Pupils: Pupils are equal, round, and reactive to light.   Cardiovascular:      Rate and Rhythm: Normal rate and regular rhythm.   Pulmonary:      Effort: Pulmonary effort is normal.   Abdominal:      General:  There is no distension.      Palpations: Abdomen is soft.      Tenderness: There is no abdominal tenderness. There is no guarding.      Comments: No scars   Musculoskeletal: Normal range of motion.   Skin:     General: Skin is warm and dry.   Neurological:      Mental Status: She is alert and oriented to person, place, and time.   Psychiatric:         Behavior: Behavior normal.         ABO type: B POS    Imaging Studies:       CT angiogram:   Left renal arteries: 1   Right renal arteries: 1   Other important findings: na    Assessment:   No diagnosis found.    Plan:   Transplant Surgery Donor Candidacy:   Ms. José remains a suitable candidate for kidney donation.    Based on the preoperative evaluation, a left robotic donor nephrectomy is planned.  Phu Pinon Jr, MD       Counseling:   I discussed with Ms. José that donation is a voluntary activity and reiterated it should be done willingly and for altruistic reasons only.  I reviewed that no payment should be received for donating.  I also discussed that coercion, guilt, pressure, or feelings of obligation are not appropriate reasons to donate.  The option to withdraw at any time was emphasized.  Ms. José was reminded that a medical opt out can be given to protect her confidentiality, and no member of the transplant team will discuss specifics of her health or medical/social history with anyone else without permission.  The need for lifelong routine medical follow-up for optimal health, including routine health maintenance was reviewed.    I discussed the risks related to surgery including the risks related to anesthesia, bleeding, infection, inability for surgery to be performed laparoscopically, risks of reoperation as well as the risks of death.  We discussed the length of hospitalization, return to work times, as well as follow-up post-donation.    I also discussed the slight possibility that due to problems with the recipient operation, the transplant  might not be able to be completed after the organ was already removed. If such a situation should arise, the donor prefers that the organ be transplanted into a suitable third-party recipient.

## 2020-09-22 NOTE — PROGRESS NOTES
Kidney Donor Preoperative Evaluation    Subjective:     CC:  Preoperative evaluation before donor nephrectomy.    HPI:  Ms. José is a 40 y.o. year old Unknown female who has been approved to be a living donor in the pared exchange.  She denies any changes in her health condition since her previous visit.      Patient denies any history of coronary artery disease, stroke, seizure disorder, chronic obstructive pulmonary disease, liver disease, kidney stones, gallstones, deep venous thrombosis, pulmonary embolism, recurrent urinary tract infections or malignancies.      Reviewed and discussed with patient in detail her risks of kidney donation with GFR of 87.27 at current age of 40. She understands that she is at risk of developing CKD and would need routine medical care. She reports she is not having any more children.        Past Medical History:   Diagnosis Date    Pre-eclampsia in third trimester     Willing to be kidney donor      No past surgical history on file.  Social History     Tobacco Use    Smoking status: Never Smoker    Smokeless tobacco: Never Used   Substance Use Topics    Alcohol use: Not Currently     Comment: social in the past     Drug use: Never     Family History   Problem Relation Age of Onset    Nephrolithiasis Mother     Other Mother         amyloidosis    Diabetes Father         diagnosed in his 50s; controlled with diet and exercise    Hypertension Father         diagnosed in his 50s; controlled with diet and exercise    Hypertension Paternal Grandmother         diagnosed in her 40-50s    Hepatitis Sister         [facility]     Lung cancer Maternal Grandfather     No Known Problems Sister     Kidney disease Neg Hx     Colon cancer Neg Hx     Breast cancer Neg Hx        Review of Systems   Constitutional: Negative for appetite change, chills, fatigue and fever.   HENT: Negative for trouble  swallowing.    Respiratory: Negative for cough, chest tightness, shortness of breath and wheezing.    Cardiovascular: Negative for chest pain, palpitations and leg swelling.   Gastrointestinal: Negative for abdominal pain, constipation, diarrhea and nausea.   Genitourinary: Negative for difficulty urinating, frequency and urgency.   Musculoskeletal: Negative for arthralgias and myalgias.   Skin: Negative for rash.   Neurological: Negative for dizziness, weakness, light-headedness and headaches.   Psychiatric/Behavioral: Negative for sleep disturbance.       Objective:   body mass index is 25.4 kg/m².   Vitals:    09/22/20 0756   BP: 116/76   Pulse: 72   Resp: 18   Temp: 97.2 °F (36.2 °C)       Physical Exam  Constitutional:       General: She is not in acute distress.     Appearance: She is well-developed. She is not diaphoretic.   Cardiovascular:      Rate and Rhythm: Normal rate and regular rhythm.      Heart sounds: Normal heart sounds. No murmur. No friction rub. No gallop.    Pulmonary:      Effort: Pulmonary effort is normal. No respiratory distress.      Breath sounds: Normal breath sounds. No wheezing or rales.   Abdominal:      General: Bowel sounds are normal. There is no distension.      Palpations: Abdomen is soft.      Tenderness: There is no abdominal tenderness.   Musculoskeletal: Normal range of motion.         General: No tenderness.   Skin:     General: Skin is warm and dry.      Findings: No rash.      Nails: There is no clubbing.     Neurological:      Mental Status: She is alert and oriented to person, place, and time.   Psychiatric:         Behavior: Behavior normal.         Labs:     Lab Results   Component Value Date    WBC 5.73 07/18/2019    HGB 12.7 07/18/2019    HCT 40.3 07/18/2019    MCV 74 (L) 07/18/2019     07/18/2019       CMP  Sodium   Date Value Ref Range Status   07/18/2019 142 136 - 145 mmol/L Final     Potassium   Date Value Ref Range Status   07/18/2019 4.4 3.5 - 5.1  mmol/L Final     Chloride   Date Value Ref Range Status   07/18/2019 106 95 - 110 mmol/L Final     CO2   Date Value Ref Range Status   07/18/2019 25 23 - 29 mmol/L Final     Glucose   Date Value Ref Range Status   07/18/2019 85 70 - 110 mg/dL Final     BUN, Bld   Date Value Ref Range Status   07/18/2019 17 6 - 20 mg/dL Final     Creatinine   Date Value Ref Range Status   12/20/2019 0.68 0.57 - 1.00 mg/dL Final     Calcium   Date Value Ref Range Status   07/18/2019 9.9 8.7 - 10.5 mg/dL Final     Total Protein   Date Value Ref Range Status   07/18/2019 8.6 (H) 6.0 - 8.4 g/dL Final     Albumin   Date Value Ref Range Status   07/18/2019 4.2 3.5 - 5.2 g/dL Final     Total Bilirubin   Date Value Ref Range Status   07/18/2019 0.5 0.1 - 1.0 mg/dL Final     Comment:     For infants and newborns, interpretation of results should be based  on gestational age, weight and in agreement with clinical  observations.  Premature Infant recommended reference ranges:  Up to 24 hours.............<8.0 mg/dL  Up to 48 hours............<12.0 mg/dL  3-5 days..................<15.0 mg/dL  6-29 days.................<15.0 mg/dL       Alkaline Phosphatase   Date Value Ref Range Status   07/18/2019 86 55 - 135 U/L Final     AST   Date Value Ref Range Status   07/18/2019 16 10 - 40 U/L Final     ALT   Date Value Ref Range Status   07/18/2019 16 10 - 44 U/L Final     Anion Gap   Date Value Ref Range Status   07/18/2019 11 8 - 16 mmol/L Final     eGFR if    Date Value Ref Range Status   07/18/2019 >60 >60 mL/min/1.73 m^2 Final     eGFR if non    Date Value Ref Range Status   12/20/2019 112 >59 mL/min/1.73 Final         Labs were reviewed with the patient.    ABO type: B POS    Assessment:     1. Willing to be kidney donor        Plan:   Donor Candidacy:   Ms. José remains a suitable candidate for kidney donation.    Brigette Diaz NP         Counseling:   I discussed with Ms. José that donation is a voluntary  activity and reiterated it should be done willingly and for altruistic reasons only.  I reviewed that no payment should be received for donating.  I also discussed that coercion, guilt, pressure, or feelings of obligation are not appropriate reasons to donate.  The option to withdraw at any time was emphasized.  Ms. José was reminded that a medical opt out can be given to protect her confidentiality, and no member of the transplant team will discuss specifics of her health or medical/social history with anyone else without permission.  The need for lifelong routine medical follow-up for optimal health, including routine health maintenance was reviewed.    We also discussed the long term risks associated with kidney donation.  I told the patient that her GFR should return to within 75-80% of pre-donation level within six months of donation.  I informed the patient that there is a small risk of developing albuminuria and hypertension following donor nephrectomy.  I also informed the patient that based on current literature, the risk of developing end-stage renal disease following donor nephrectomy is similar to the general population.    I rereviewed with Ms. José available lab results and other diagnostics from the evaluation process    Additional Counseling:   The patient was counseled on the need for regular follow-up with a primary care physician for blood pressure and cholesterol screening.  The importance of age appropriate health screening was also emphasized.    Follow-up:  Follow-up with transplant surgery per protocol.

## 2020-09-22 NOTE — LETTER
September 23, 2020                     [facility]  [address]  [facility] [address]  Phone: [phone]   Patient: Jo José   MR Number: [id number]   YOB: 1980   Date of Visit: 9/22/2020       Dear      Thank you for referring Jo José to me for evaluation. Attached you will find relevant portions of my assessment and plan of care.    If you have questions, please do not hesitate to call me. I look forward to following Jo José along with you.    Sincerely,    Brigette Diaz, NP    Enclosure    If you would like to receive this communication electronically, please contact [email or web address] or [phone] to request [employer] access.    [employer] is a tool which provides read-only access to select patient information with whom you have a relationship. Its easy to use and provides real time access to review your patients record including encounter summaries, notes, results, and demographic information.    If you feel you have received this communication in error or would no longer like to receive these types of communications, please e-mail [email or web address]

## 2020-09-22 NOTE — PROGRESS NOTES
Met with Jo José in the clinic as part of her pre-transplant DONOR clearance appointment.    1) Performed a complete medication reconciliation.    2) Obtained copies of insurance cards, patient understood that the Pharm.D. will order medications, and that medications must be available prior to discharge   3) Discussed medication education that will occur post-transplant   4) Patient will use [facility] for first fill.    No current outpatient medications on file.     No current facility-administered medications for this visit.        Patient verbalized understanding and had the opportunity to ask questions    Note by:  Honorio Mesa  P4 Pharmacy Student

## 2020-09-25 NOTE — PROGRESS NOTES
DONOR PRE-OP NOTE    Potential Donor Name: Jo José [id number]  Encounter Date: 9/22/2020    Sex: female  YOB: 1980  Age: 40 y.o.    Housing/Contact Info:  [address]  [place] [address]  Telephone Information:   Mobile [phone]    Home: [phone] (home)  Work: There is no work phone number on file.  E-mail: [email or web address]    Potential Surgery Date: October 5,2020  Potential Recipient Name: paired donor program, Clinic Number: paired donor program   Potential Recipient Relationship: unrelated     Patient presents as alert and oriented x 4, pleasant, good eye contact, well groomed, recall good, concentration/judgement good, average intelligence, calm, communicative, cooperative and asking and answering questions appropriately. Patient presents as a 40 y.o. year old female to donor pre-op appointment for scheduled kidney living donor surgery. Patient was unaccompanied .  Patient states that she is independent with ADLs at this time.  Patient states continued motivation to pursue organ donation at this time.    Does patient drive?  Patient is aware will not be able to drive until medically cleared by the transplant team. Patient verbalizes understanding that patient will need assistance for all transportation needs until medically cleared to drive.    Caregivers/Transportation:  Name: Amber José   Age: 72  Phone: [phone]   Relationship: mother-in-law  Does person drive? yes  Does person have own/reliable transportation? 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 no  Home Health: patient utilizing? no  DME:  patient utilizing? no    Living Will:  no  Healthcare Power of : no  Pt reports trusting  with medical decisions as needed   Written LW/HCPA and verbal information presented to patient today.    Insurance: See potential recipient's insurance for donor coverage.    Patient's Insurance: Does potential donor have their own health insurance? Yes  Possible concerns regarding insurance post-donation reviewed. Patient verbalizes clear understanding.    Financial:  Employment: Patient is currently employed as a Doctor. Patient does plan to return to work once medically cleared.   Spouse/Significant Other Employment: Pt's  is a doctor   Patient states does not expect to have any financial problems following transplant surgery.  Patient states has not conducted fundraising to assist with donation costs.    Tobacco/Alcohol/Illicit Drug Abuse: Patient reports does not use tobacco products, alcohol, illicit drugs and non-prescription medications and that patient does plan to remain abstinent.    Psychiatric History: patient denies    Coping: Identify Patient & Caregiver Strategies to [place]:   1. Currently & Pre-transplant - family support   2. At the time of organ donation surgery - family support   3. During post-Organ donation & Recovery Period - family support     Resources, information and support provided. Psychosocial aspects regarding organ donation and transplantation were discussed. Patient reports having a clear understanding of resources, information, support and psychosocial aspects related to organ donation.    Discharge Plan: [facility] under the care of patient's mother-in-law, Amber José  post-organ transplant. Patient states that patient's mothre-in-law will be present as caregiver in the hospital. Patient's mother-in-law will transport patient home. Patient states agreement with not driving and not returning to work until medically cleared to do so.    Patient continues to report having a  clear understanding of confidentiality, option to not donate, alternative treatment options, importance of compliance, resources and resource limitations, insurance and insurance insurable limitations, educational information, and the risks involved. Patient understands that the transplant may or may not work, the transplant date/donation date may be cancelled or postponed, and the psychosocial factors involved before, during and after donation.    Patient states having clear understanding and realistic expectations regarding the potential risks and potential benefits of organ transplantation and organ donation. Patient agrees to further discuss with health care team members and support system members, as well as to utilize available resources and express questions and/or concerns. Resources and information provided and reviewed.    Patient denies feelings of coercion, pressure or obligation to donate. Patient states that she is not receiving compensation for organ donation.    Patient reports motivation to pursue organ donation at this time.    Suitability for Donation: At this time, patient presents as a a suitable candidate for organ donation. Patient states does have a caregiver plan, transportation plan, and lodging plan in place. Patient states that patient does have medical and prescription medicine insurance in place and does have a plan in place to afford post-transplant costs.    Patient provided verbal permission to release any necessary information to outside resources for patient care and discharge planning.  Resources and information provided and reviewed.  Patient is choosing local pharmacy.    Transplant Pharmacy Name: [facility] [place]  Pharmacy Contact Information: Inpt [id number]                                                         Outpt: [id number] [address] [id number]       provided psychosocial support, counseling, resources, education, assistance, and  discharge planning.  remains available.    Recommendations/Additional Comments: SW reports no further concerns or recommendations.     Patient states is aware of [facility]'s affiliation and/or partnership with agencies in home health care, LTAC, SNF, [facility], and other hospitals and clinics.

## 2020-10-02 ENCOUNTER — HOSPITAL ENCOUNTER (OUTPATIENT)
Dept: PREADMISSION TESTING | Facility: HOSPITAL | Age: 40
Discharge: HOME | End: 2020-10-02
Attending: ANESTHESIOLOGY
Payer: COMMERCIAL

## 2020-10-02 ENCOUNTER — LAB VISIT (OUTPATIENT)
Dept: SURGERY | Facility: CLINIC | Age: 40
End: 2020-10-02
Payer: COMMERCIAL

## 2020-10-02 ENCOUNTER — TELEPHONE (OUTPATIENT)
Dept: TRANSPLANT | Facility: CLINIC | Age: 40
End: 2020-10-02

## 2020-10-02 VITALS
WEIGHT: 146 LBS | HEIGHT: 63 IN | BODY MASS INDEX: 25.87 KG/M2 | RESPIRATION RATE: 16 BRPM | OXYGEN SATURATION: 99 % | HEART RATE: 81 BPM | SYSTOLIC BLOOD PRESSURE: 110 MMHG | DIASTOLIC BLOOD PRESSURE: 62 MMHG | TEMPERATURE: 98 F

## 2020-10-02 DIAGNOSIS — Z01.818 PRE-OP EVALUATION: ICD-10-CM

## 2020-10-02 LAB — SARS-COV-2 RNA RESP QL NAA+PROBE: NOT DETECTED

## 2020-10-02 PROCEDURE — U0003 INFECTIOUS AGENT DETECTION BY NUCLEIC ACID (DNA OR RNA); SEVERE ACUTE RESPIRATORY SYNDROME CORONAVIRUS 2 (SARS-COV-2) (CORONAVIRUS DISEASE [COVID-19]), AMPLIFIED PROBE TECHNIQUE, MAKING USE OF HIGH THROUGHPUT TECHNOLOGIES AS DESCRIBED BY CMS-2020-01-R: HCPCS | Mod: NTX

## 2020-10-02 NOTE — TELEPHONE ENCOUNTER
Confirmed COVID 19 test completed. Will contact her if result is positive.   Encouraged to call for any further questions or concerns

## 2020-10-02 NOTE — DISCHARGE INSTRUCTIONS
Your surgery has been scheduled for:__________________________________________    You should report to:  ____[facility], located on the [place] side of the first floor of the           [facility] ([phone])  ____[facility], located on the [facility] side of the            Second floor of the [facility] ([phone])  ____[facility] [address] [facility] [address]  Please Note   - Tell your doctor if you take Aspirin, products containing Aspirin, herbal medications  or blood thinners, such as Coumadin, Ticlid, or Plavix.  (Consult your provider regarding holding or stopping before surgery).  - Arrange for someone to drive you home following surgery.  You will not be allowed to leave the surgical facility alone or drive yourself home following sedation and anesthesia.  Before Surgery  - Stop taking all herbal medications 14days prior to surgery  - No Motrin/Advil (Ibuprofen) 7 days before surgery  - No Aleve (Naproxen) 7 days before surgery  - Stop Taking Aspirin, products containing Aspirin _____days before surgery  - Stop taking blood thinners_______days before surgery  - No Goody's/BC  Powder 7 days before surgery  - Refrain from drinking alcoholic beverages for 24hours before and after surgery  - Stop or limit smoking _________days before surgery  - You may take Tylenol for pain  Night before Surgery   Stop ALL solid food, gum, candy (including vitamins) 8 hours before arrival time.  (Please note: If your surgeon gives you different eating and drinking instructions, please follow surgeon's directions.)   Stop all CLOUDY liquids: coffee with creamer, formula, tube feeds, cloudy juices, non-human milk and breast milk with additives, 6 hours prior to arrival time.   Stop plain breast milk 4 hours prior to arrival time.   The patient should be ENCOURAGED to drink carbohydrate-rich clear liquids (sports  drinks, clear juices) until 2 hours prior to arrival time.   CLEAR liquids include only water, black coffee NO creamer, clear oral rehydration drinks, clear sports drinks or clear fruit juices (no orange juice, no pulpy juices, no apple cider). Advise patients if they can read newsprint through the liquid, it qualifies as clear liquid.    IF IN DOUBT, drink water instead.   - Take a shower or bath (shower is recommended).  Bathe with Hibiclens soap or an antibacterial soap from the neck down.  If not supplied by your surgeon, hibiclens soap will need to be purchased over the counter in pharmacy.  Rinse soap off thoroughly.  - Shampoo your hair with your regular shampoo  The Day of Surgery  · NOTHING TO  DRINK 2 hours before arrival time. If you are told to take medication on the morning of surgery, it may be taken with a sip of water.   - Take another bath or shower with hibiclens or any antibacterial soap, to reduce the chance of infection.  - Take heart and blood pressure medications with a small sip of water, as advised by the perioperative team.  - Do not take fluid pills  - You may brush your teeth and rinse your mouth, but do not swallow any additional water.   - Do not apply perfumes, powder, body lotions or deodorant on the day of surgery.  - Nail polish should be removed.  - Do not wear makeup or moisturizer  - Wear comfortable clothes, such as a button front shirt and loose fitting pants.  - Leave all jewelry, including body piercings, and valuables at home.    - Bring any devices you will neeed after surgery such as crutches or canes.  - If you have sleep apnea, please bring your CPAP machine  In the event that your physical condition changes including the onset of a cold or respiratory illness, or if you have to delay or cancel your surgery, please notify your surgeon.  Anesthesia: General Anesthesia     You are watched continuously during your procedure by your anesthesia provider.     Youre due to  have surgery. During surgery, youll be given medicine called anesthesia or anesthetic. This will keep you comfortable and pain-free. Your anesthesia provider will use general anesthesia.  What is general anesthesia?  General anesthesia puts you into a state like deep sleep. It goes into the bloodstream (IV anesthetics), into the lungs (gas anesthetics), or both. You feel nothing during the procedure. You will not remember it. During the procedure, the anesthesia provider monitors you continuously. He or she checks your heart rate and rhythm, blood pressure, breathing, and blood oxygen.  · IV anesthetics. IV anesthetics are given through an IV line in your arm. Theyre often given first. This is so you are asleep before a gas anesthetic is started. Some kinds of IV anesthetics relieve pain. Others relax you. Your doctor will decide which kind is best in your case.  · Gas anesthetics. Gas anesthetics are breathed into the lungs. They are often used to keep you asleep. They can be given through a facemask or a tube placed in your larynx or trachea (breathing tube).  ? If you have a facemask, your anesthesia provider will most likely place it over your nose and mouth while youre still awake. Youll breathe oxygen through the mask as your IV anesthetic is started. Gas anesthetic may be added through the mask.  ? If you have a tube in the larynx or trachea, it will be inserted into your throat after youre asleep.  Anesthesia tools and medicines  You will likely have:  · IV anesthetics. These are put into an IV line into your bloodstream.  · Gas anesthetics. You breathe these anesthetics into your lungs, where they pass into your bloodstream.  · Pulse oximeter. This is a small clip that is attached to the end of your finger. This measures your blood oxygen level.  · Electrocardiography leads (electrodes). These are small sticky pads that are placed on your chest. They record your heart rate and rhythm.  · Blood pressure  cuff. This reads your blood pressure.  Risks and possible complications  General anesthesia has some risks. These include:  · Breathing problems  · Nausea and vomiting  · Sore throat or hoarseness (usually temporary)  · Allergic reaction to the anesthetic  · Irregular heartbeat (rare)  · Cardiac arrest (rare)   Anesthesia safety  · Follow all instructions you are given for how long not to eat or drink before your procedure.  · Be sure your doctor knows what medicines and drugs you take. This includes over-the-counter medicines, herbs, supplements, alcohol or other drugs. You will be asked when those were last taken.  · Have an adult family member or friend drive you home after the procedure.  · For the first 24 hours after your surgery:  ? Do not drive or use heavy equipment.  ? Do not make important decisions or sign legal documents. If important decisions or signing legal documents is necessary during the first 24 hours after surgery, have a trusted family member or spouse act on your behalf.  ? Avoid alcohol.  ? Have a responsible adult stay with you. He or she can watch for problems and help keep you safe.  Date Last Reviewed: 12/1/2016 © 2000-2017 [employer]. [address] [place] [id number]. All rights reserved. This information is not intended as a substitute for professional medical care. Always follow your healthcare professional's instructions

## 2020-10-12 DIAGNOSIS — Z00.5 TRANSPLANT DONOR EVALUATION: Primary | ICD-10-CM

## 2020-10-15 ENCOUNTER — LAB VISIT (OUTPATIENT)
Dept: LAB | Facility: HOSPITAL | Age: 40
End: 2020-10-15
Attending: TRANSPLANT SURGERY
Payer: COMMERCIAL

## 2020-10-15 ENCOUNTER — OFFICE VISIT (OUTPATIENT)
Dept: TRANSPLANT | Facility: CLINIC | Age: 40
End: 2020-10-15
Payer: COMMERCIAL

## 2020-10-15 VITALS
SYSTOLIC BLOOD PRESSURE: 105 MMHG | HEART RATE: 82 BPM | HEIGHT: 64 IN | WEIGHT: 145.31 LBS | RESPIRATION RATE: 16 BRPM | BODY MASS INDEX: 24.81 KG/M2 | OXYGEN SATURATION: 98 % | DIASTOLIC BLOOD PRESSURE: 61 MMHG

## 2020-10-15 DIAGNOSIS — Z52.4 KIDNEY DONOR: ICD-10-CM

## 2020-10-15 DIAGNOSIS — Z52.4 KIDNEY DONOR: Primary | ICD-10-CM

## 2020-10-15 DIAGNOSIS — Z00.5 TRANSPLANT DONOR EVALUATION: ICD-10-CM

## 2020-10-15 LAB
ABO + RH BLD: NORMAL
BLD GP AB SCN CELLS X3 SERPL QL: NORMAL

## 2020-10-15 PROCEDURE — 86703 HIV-1/HIV-2 1 RESULT ANTBDY: CPT | Mod: TXP

## 2020-10-15 PROCEDURE — 3074F PR MOST RECENT SYSTOLIC BLOOD PRESSURE < 130 MM HG: ICD-10-PCS | Mod: CPTII,S$GLB,TXP, | Performed by: TRANSPLANT SURGERY

## 2020-10-15 PROCEDURE — 3074F SYST BP LT 130 MM HG: CPT | Mod: CPTII,S$GLB,TXP, | Performed by: TRANSPLANT SURGERY

## 2020-10-15 PROCEDURE — 87340 HEPATITIS B SURFACE AG IA: CPT | Mod: TXP

## 2020-10-15 PROCEDURE — 3078F PR MOST RECENT DIASTOLIC BLOOD PRESSURE < 80 MM HG: ICD-10-PCS | Mod: CPTII,S$GLB,TXP, | Performed by: TRANSPLANT SURGERY

## 2020-10-15 PROCEDURE — 86704 HEP B CORE ANTIBODY TOTAL: CPT | Mod: TXP

## 2020-10-15 PROCEDURE — 87535 HIV-1 PROBE&REVERSE TRNSCRPJ: CPT | Mod: TXP

## 2020-10-15 PROCEDURE — 99999 PR PBB SHADOW E&M-EST. PATIENT-LVL III: ICD-10-PCS | Mod: PBBFAC,TXP,,

## 2020-10-15 PROCEDURE — 99999 PR PBB SHADOW E&M-EST. PATIENT-LVL III: CPT | Mod: PBBFAC,TXP,,

## 2020-10-15 PROCEDURE — 3078F DIAST BP <80 MM HG: CPT | Mod: CPTII,S$GLB,TXP, | Performed by: TRANSPLANT SURGERY

## 2020-10-15 PROCEDURE — 86803 HEPATITIS C AB TEST: CPT | Mod: TXP

## 2020-10-15 PROCEDURE — 3008F PR BODY MASS INDEX (BMI) DOCUMENTED: ICD-10-PCS | Mod: CPTII,S$GLB,TXP, | Performed by: TRANSPLANT SURGERY

## 2020-10-15 PROCEDURE — 99213 PR OFFICE/OUTPT VISIT, EST, LEVL III, 20-29 MIN: ICD-10-PCS | Mod: S$GLB,TXP,, | Performed by: TRANSPLANT SURGERY

## 2020-10-15 PROCEDURE — 86901 BLOOD TYPING SEROLOGIC RH(D): CPT | Mod: TXP

## 2020-10-15 PROCEDURE — 3008F BODY MASS INDEX DOCD: CPT | Mod: CPTII,S$GLB,TXP, | Performed by: TRANSPLANT SURGERY

## 2020-10-15 PROCEDURE — 99213 OFFICE O/P EST LOW 20 MIN: CPT | Mod: S$GLB,TXP,, | Performed by: TRANSPLANT SURGERY

## 2020-10-15 RX ORDER — HEPARIN SODIUM 5000 [USP'U]/ML
5000 INJECTION, SOLUTION INTRAVENOUS; SUBCUTANEOUS
Status: CANCELLED | OUTPATIENT
Start: 2020-10-15

## 2020-10-15 NOTE — PROGRESS NOTES
"   Transplant Surgery Kidney Donor Preoperative Evaluation    Subjective:   CC:  Preoperative evaluation before donor nephrectomy.    HPI:  Ms. José is a 40 y.o. year old Unknown female who has been approved to be a living kidney donor.  She denies any changes in her health condition since her previous visit.      Past Medical History:   Diagnosis Date    Pre-eclampsia in third trimester     Willing to be kidney donor      No past surgical history on file.  Review of patient's allergies indicates:  No Known Allergies  Review of Systems   Constitutional: Negative.    HENT: Negative.    Eyes: Negative.    Respiratory: Negative.    Cardiovascular: Negative.    Gastrointestinal: Negative.    Genitourinary: Negative.    Musculoskeletal: Negative.    Neurological: Negative.    Hematological: Negative.    Psychiatric/Behavioral: Negative.        Objective:   Blood pressure 105/61, pulse 82, resp. rate 16, height 5' 4" (1.626 m), weight 65.9 kg (145 lb 4.5 oz), SpO2 98 %.  Physical Exam  Constitutional:       General: She is not in acute distress.     Appearance: Normal appearance.   HENT:      Head: Normocephalic and atraumatic.      Nose: Nose normal.      Mouth/Throat:      Mouth: Mucous membranes are moist.      Pharynx: Oropharynx is clear.   Eyes:      Extraocular Movements: Extraocular movements intact.      Conjunctiva/sclera: Conjunctivae normal.   Neck:      Musculoskeletal: No neck rigidity.      Vascular: No carotid bruit.   Cardiovascular:      Rate and Rhythm: Normal rate and regular rhythm.      Pulses:           Femoral pulses are 2+ on the right side and 2+ on the left side.     Heart sounds: Normal heart sounds.   Pulmonary:      Effort: Pulmonary effort is normal.      Breath sounds: Normal breath sounds.   Abdominal:      General: There is no distension.      Palpations: Abdomen is soft.      Tenderness: There is no abdominal tenderness. There is no guarding.   Musculoskeletal:         General: No " swelling or deformity.   Skin:     General: Skin is warm and dry.   Neurological:      General: No focal deficit present.      Mental Status: She is alert and oriented to person, place, and time.   Psychiatric:         Mood and Affect: Mood normal.         Thought Content: Thought content normal.         ABO type: B POS    Imaging Studies:  Nuclear split function renal scan:   Left:  49.05%   Right: 50.95%  CT angiogram:   Left renal arteries: 1   Right renal arteries: 1     The right kidney measures 5 x 4.3 x 10 cm AP by TV by cc diameter.  There is a single right renal artery which bifurcates 2.1 cm from its origin.  There is a single right renal vein which branches 1.5 cm from the IVC.  There is a single right collecting system.        The left kidney measures 5.9 x 3.7 x 10.7 cm in AP by TV by cc diameters.  There is a single left renal artery which bifurcates 3.6 cm from its origin.  There is a single left renal vein which branches at the renal hilum.  There is a single left collecting system.    Assessment:   No diagnosis found.    Plan:   Transplant Surgery Donor Candidacy:   Ms. José remains an excellent candidate for kidney donation.    Based on the preoperative evaluation, a left robotic donor nephrectomy is planned.  Nickolas Conrad MD       Counseling:   I discussed with Ms. José that donation is a voluntary activity and reiterated it should be done willingly and for altruistic reasons only.  I reviewed that no payment should be received for donating.  I also discussed that coercion, guilt, pressure, or feelings of obligation are not appropriate reasons to donate.  The option to withdraw at any time was emphasized.  Ms. José was reminded that a medical opt out can be given to protect her confidentiality, and no member of the transplant team will discuss specifics of her health or medical/social history with anyone else without permission.  The need for lifelong routine medical follow-up for optimal  health, including routine health maintenance was reviewed.    I discussed the risks related to surgery including the risks related to anesthesia, bleeding, infection, inability for surgery to be performed laparoscopically, risks of reoperation as well as the risks of death.  We discussed the length of hospitalization, return to work times, as well as follow-up post-donation.    I also discussed the slight possibility that due to problems with the recipient operation, the transplant might not be able to be completed after the organ was already removed. If such a situation should arise, the donor prefers that the organ be transplanted into a suitable third-party recipient.

## 2020-10-15 NOTE — PROGRESS NOTES
PRE-OP TEACHING NOTE    Jo José is here today for pre-op appointments.  Pre-op instructions reviewed and written information was provided.  All questions were answered.  Discussed possibility that surgery may be rescheduled if the program is busy with  donor transplants. COVID-19 testing to be done on 10/23/20.   Patient agreed and verbalized understanding of all instructions.

## 2020-10-16 LAB
HEPATITIS B CORE  AB, DONOR EVAL, (BLOOD CENTER): NORMAL
HEPATITIS B SURFACE AG, DONOR EVAL, (BLOOD CENTER): NORMAL
HEPATITIS C ANTIBODY,DONOR EVAL (BLOOD CENTER): NORMAL
HIV1/2 AG/AB, DONOR EVAL (BLOOD CENTER): NORMAL
NAT PANEL, DONOR EVAL (BLOOD CENTER): NORMAL

## 2020-10-23 LAB
SARS-COV-2 RNA RESP QL NAA+PROBE: NEGATIVE
SARS-COV-2 RNA RESP QL NAA+PROBE: NEGATIVE

## 2020-10-26 ENCOUNTER — HOSPITAL ENCOUNTER (INPATIENT)
Facility: HOSPITAL | Age: 40
LOS: 1 days | Discharge: HOME | DRG: 661 | End: 2020-10-27
Attending: TRANSPLANT SURGERY | Admitting: TRANSPLANT SURGERY
Payer: COMMERCIAL

## 2020-10-26 ENCOUNTER — ANESTHESIA (OUTPATIENT)
Dept: SURGERY | Facility: HOSPITAL | Age: 40
DRG: 661 | End: 2020-10-26
Payer: COMMERCIAL

## 2020-10-26 ENCOUNTER — ANESTHESIA EVENT (OUTPATIENT)
Dept: SURGERY | Facility: HOSPITAL | Age: 40
DRG: 661 | End: 2020-10-26
Payer: COMMERCIAL

## 2020-10-26 DIAGNOSIS — Z52.4 KIDNEY DONOR: Primary | ICD-10-CM

## 2020-10-26 LAB
B-HCG UR QL: NEGATIVE
CTP QC/QA: YES
HCT VFR BLD AUTO: 30.8 % (ref 37–48.5)
HCT VFR BLD AUTO: 32.5 % (ref 37–48.5)
HCT VFR BLD AUTO: 34.3 % (ref 37–48.5)

## 2020-10-26 PROCEDURE — S5010 5% DEXTROSE AND 0.45% SALINE: HCPCS | Performed by: TRANSPLANT SURGERY

## 2020-10-26 PROCEDURE — 81025 URINE PREGNANCY TEST: CPT | Mod: NTX | Performed by: TRANSPLANT SURGERY

## 2020-10-26 PROCEDURE — D9220A PRA ANESTHESIA: Mod: CRNA,ICN,, | Performed by: NURSE ANESTHETIST, CERTIFIED REGISTERED

## 2020-10-26 PROCEDURE — 20600001 HC STEP DOWN PRIVATE ROOM

## 2020-10-26 PROCEDURE — 25000003 PHARM REV CODE 250: Performed by: NURSE ANESTHETIST, CERTIFIED REGISTERED

## 2020-10-26 PROCEDURE — 63600175 PHARM REV CODE 636 W HCPCS: Performed by: ANESTHESIOLOGY

## 2020-10-26 PROCEDURE — 63600175 PHARM REV CODE 636 W HCPCS

## 2020-10-26 PROCEDURE — 50547 PR LAP,DONOR KIDNEY REMOV,LIVING: ICD-10-PCS | Mod: 82,LT,, | Performed by: TRANSPLANT SURGERY

## 2020-10-26 PROCEDURE — 36415 COLL VENOUS BLD VENIPUNCTURE: CPT

## 2020-10-26 PROCEDURE — 63600175 PHARM REV CODE 636 W HCPCS: Performed by: NURSE ANESTHETIST, CERTIFIED REGISTERED

## 2020-10-26 PROCEDURE — 50547 LAPARO REMOVAL DONOR KIDNEY: CPT | Mod: LT,,, | Performed by: TRANSPLANT SURGERY

## 2020-10-26 PROCEDURE — 85014 HEMATOCRIT: CPT | Mod: 91

## 2020-10-26 PROCEDURE — 94761 N-INVAS EAR/PLS OXIMETRY MLT: CPT

## 2020-10-26 PROCEDURE — 27201423 OPTIME MED/SURG SUP & DEVICES STERILE SUPPLY: Performed by: TRANSPLANT SURGERY

## 2020-10-26 PROCEDURE — 25000003 PHARM REV CODE 250: Performed by: TRANSPLANT SURGERY

## 2020-10-26 PROCEDURE — D9220A PRA ANESTHESIA: ICD-10-PCS | Mod: ANES,,, | Performed by: ANESTHESIOLOGY

## 2020-10-26 PROCEDURE — D9220A PRA ANESTHESIA: Mod: ANES,,, | Performed by: ANESTHESIOLOGY

## 2020-10-26 PROCEDURE — 71000033 HC RECOVERY, INTIAL HOUR: Performed by: TRANSPLANT SURGERY

## 2020-10-26 PROCEDURE — 63600175 PHARM REV CODE 636 W HCPCS: Performed by: TRANSPLANT SURGERY

## 2020-10-26 PROCEDURE — 71000015 HC POSTOP RECOV 1ST HR: Performed by: TRANSPLANT SURGERY

## 2020-10-26 PROCEDURE — D9220A PRA ANESTHESIA: ICD-10-PCS | Mod: CRNA,ICN,, | Performed by: NURSE ANESTHETIST, CERTIFIED REGISTERED

## 2020-10-26 PROCEDURE — 36000712 HC OR TIME LEV V 1ST 15 MIN: Performed by: TRANSPLANT SURGERY

## 2020-10-26 PROCEDURE — 37000009 HC ANESTHESIA EA ADD 15 MINS: Performed by: TRANSPLANT SURGERY

## 2020-10-26 PROCEDURE — 63600175 PHARM REV CODE 636 W HCPCS: Mod: NTX | Performed by: TRANSPLANT SURGERY

## 2020-10-26 PROCEDURE — 36000713 HC OR TIME LEV V EA ADD 15 MIN: Performed by: TRANSPLANT SURGERY

## 2020-10-26 PROCEDURE — 71000016 HC POSTOP RECOV ADDL HR: Performed by: TRANSPLANT SURGERY

## 2020-10-26 PROCEDURE — 97802 MEDICAL NUTRITION INDIV IN: CPT

## 2020-10-26 PROCEDURE — 50547 LAPARO REMOVAL DONOR KIDNEY: CPT | Mod: 82,LT,, | Performed by: TRANSPLANT SURGERY

## 2020-10-26 PROCEDURE — 50547 PR LAP,DONOR KIDNEY REMOV,LIVING: ICD-10-PCS | Mod: LT,,, | Performed by: TRANSPLANT SURGERY

## 2020-10-26 PROCEDURE — 37000008 HC ANESTHESIA 1ST 15 MINUTES: Performed by: TRANSPLANT SURGERY

## 2020-10-26 RX ORDER — BUPIVACAINE HYDROCHLORIDE 2.5 MG/ML
INJECTION, SOLUTION EPIDURAL; INFILTRATION; INTRACAUDAL
Status: DISCONTINUED | OUTPATIENT
Start: 2020-10-26 | End: 2020-10-26 | Stop reason: HOSPADM

## 2020-10-26 RX ORDER — KETOROLAC TROMETHAMINE 30 MG/ML
15 INJECTION, SOLUTION INTRAMUSCULAR; INTRAVENOUS EVERY 6 HOURS
Status: DISCONTINUED | OUTPATIENT
Start: 2020-10-26 | End: 2020-10-27 | Stop reason: HOSPADM

## 2020-10-26 RX ORDER — VECURONIUM BROMIDE FOR INJECTION 1 MG/ML
INJECTION, POWDER, LYOPHILIZED, FOR SOLUTION INTRAVENOUS
Status: DISCONTINUED | OUTPATIENT
Start: 2020-10-26 | End: 2020-10-26

## 2020-10-26 RX ORDER — DEXTROSE MONOHYDRATE AND SODIUM CHLORIDE 5; .45 G/100ML; G/100ML
INJECTION, SOLUTION INTRAVENOUS CONTINUOUS
Status: DISCONTINUED | OUTPATIENT
Start: 2020-10-26 | End: 2020-10-27 | Stop reason: HOSPADM

## 2020-10-26 RX ORDER — FENTANYL CITRATE 50 UG/ML
INJECTION, SOLUTION INTRAMUSCULAR; INTRAVENOUS
Status: DISCONTINUED | OUTPATIENT
Start: 2020-10-26 | End: 2020-10-26

## 2020-10-26 RX ORDER — HEPARIN SODIUM 5000 [USP'U]/ML
5000 INJECTION, SOLUTION INTRAVENOUS; SUBCUTANEOUS
Status: COMPLETED | OUTPATIENT
Start: 2020-10-26 | End: 2020-10-26

## 2020-10-26 RX ORDER — CEFAZOLIN SODIUM 1 G/3ML
2 INJECTION, POWDER, FOR SOLUTION INTRAMUSCULAR; INTRAVENOUS
Status: COMPLETED | OUTPATIENT
Start: 2020-10-26 | End: 2020-10-27

## 2020-10-26 RX ORDER — FUROSEMIDE 10 MG/ML
INJECTION INTRAMUSCULAR; INTRAVENOUS
Status: DISCONTINUED | OUTPATIENT
Start: 2020-10-26 | End: 2020-10-26

## 2020-10-26 RX ORDER — SODIUM CHLORIDE 9 MG/ML
INJECTION, SOLUTION INTRAVENOUS CONTINUOUS PRN
Status: DISCONTINUED | OUTPATIENT
Start: 2020-10-26 | End: 2020-10-26

## 2020-10-26 RX ORDER — CEFAZOLIN SODIUM 1 G/3ML
2 INJECTION, POWDER, FOR SOLUTION INTRAMUSCULAR; INTRAVENOUS
Status: COMPLETED | OUTPATIENT
Start: 2020-10-26 | End: 2020-10-26

## 2020-10-26 RX ORDER — MIDAZOLAM HYDROCHLORIDE 1 MG/ML
INJECTION, SOLUTION INTRAMUSCULAR; INTRAVENOUS
Status: DISCONTINUED | OUTPATIENT
Start: 2020-10-26 | End: 2020-10-26

## 2020-10-26 RX ORDER — FENTANYL CITRATE 50 UG/ML
25 INJECTION, SOLUTION INTRAMUSCULAR; INTRAVENOUS EVERY 5 MIN PRN
Status: DISCONTINUED | OUTPATIENT
Start: 2020-10-26 | End: 2020-10-26 | Stop reason: HOSPADM

## 2020-10-26 RX ORDER — HEPARIN SODIUM 5000 [USP'U]/ML
5000 INJECTION, SOLUTION INTRAVENOUS; SUBCUTANEOUS EVERY 8 HOURS
Status: DISCONTINUED | OUTPATIENT
Start: 2020-10-26 | End: 2020-10-27 | Stop reason: HOSPADM

## 2020-10-26 RX ORDER — ROCURONIUM BROMIDE 10 MG/ML
INJECTION, SOLUTION INTRAVENOUS
Status: DISCONTINUED | OUTPATIENT
Start: 2020-10-26 | End: 2020-10-26

## 2020-10-26 RX ORDER — CEFAZOLIN SODIUM 1 G/3ML
2 INJECTION, POWDER, FOR SOLUTION INTRAMUSCULAR; INTRAVENOUS
Status: DISCONTINUED | OUTPATIENT
Start: 2020-10-26 | End: 2020-10-26

## 2020-10-26 RX ORDER — HYDROMORPHONE HYDROCHLORIDE 1 MG/ML
0.2 INJECTION, SOLUTION INTRAMUSCULAR; INTRAVENOUS; SUBCUTANEOUS EVERY 5 MIN PRN
Status: DISCONTINUED | OUTPATIENT
Start: 2020-10-26 | End: 2020-10-26 | Stop reason: HOSPADM

## 2020-10-26 RX ORDER — ACETAMINOPHEN 10 MG/ML
INJECTION, SOLUTION INTRAVENOUS
Status: DISCONTINUED | OUTPATIENT
Start: 2020-10-26 | End: 2020-10-26

## 2020-10-26 RX ORDER — HYDROMORPHONE HYDROCHLORIDE 1 MG/ML
INJECTION, SOLUTION INTRAMUSCULAR; INTRAVENOUS; SUBCUTANEOUS
Status: COMPLETED
Start: 2020-10-26 | End: 2020-10-26

## 2020-10-26 RX ORDER — OXYCODONE AND ACETAMINOPHEN 5; 325 MG/1; MG/1
1 TABLET ORAL EVERY 4 HOURS PRN
Status: DISCONTINUED | OUTPATIENT
Start: 2020-10-26 | End: 2020-10-27 | Stop reason: HOSPADM

## 2020-10-26 RX ORDER — KETAMINE HCL IN 0.9 % NACL 50 MG/5 ML
SYRINGE (ML) INTRAVENOUS
Status: DISCONTINUED | OUTPATIENT
Start: 2020-10-26 | End: 2020-10-26

## 2020-10-26 RX ORDER — FAMOTIDINE 10 MG/ML
INJECTION INTRAVENOUS
Status: DISCONTINUED | OUTPATIENT
Start: 2020-10-26 | End: 2020-10-26

## 2020-10-26 RX ORDER — DOCUSATE SODIUM 100 MG/1
100 CAPSULE, LIQUID FILLED ORAL 2 TIMES DAILY
Status: DISCONTINUED | OUTPATIENT
Start: 2020-10-26 | End: 2020-10-27 | Stop reason: HOSPADM

## 2020-10-26 RX ORDER — LIDOCAINE HYDROCHLORIDE 10 MG/ML
1 INJECTION, SOLUTION EPIDURAL; INFILTRATION; INTRACAUDAL; PERINEURAL ONCE
Status: DISCONTINUED | OUTPATIENT
Start: 2020-10-26 | End: 2020-10-26

## 2020-10-26 RX ORDER — LIDOCAINE HYDROCHLORIDE 20 MG/ML
INJECTION INTRAVENOUS
Status: DISCONTINUED | OUTPATIENT
Start: 2020-10-26 | End: 2020-10-26

## 2020-10-26 RX ORDER — ONDANSETRON 2 MG/ML
4 INJECTION INTRAMUSCULAR; INTRAVENOUS EVERY 8 HOURS PRN
Status: DISCONTINUED | OUTPATIENT
Start: 2020-10-26 | End: 2020-10-27 | Stop reason: HOSPADM

## 2020-10-26 RX ORDER — DEXAMETHASONE SODIUM PHOSPHATE 4 MG/ML
INJECTION, SOLUTION INTRA-ARTICULAR; INTRALESIONAL; INTRAMUSCULAR; INTRAVENOUS; SOFT TISSUE
Status: DISCONTINUED | OUTPATIENT
Start: 2020-10-26 | End: 2020-10-26

## 2020-10-26 RX ORDER — PROPOFOL 10 MG/ML
VIAL (ML) INTRAVENOUS
Status: DISCONTINUED | OUTPATIENT
Start: 2020-10-26 | End: 2020-10-26

## 2020-10-26 RX ORDER — DEXMEDETOMIDINE HYDROCHLORIDE 100 UG/ML
INJECTION, SOLUTION INTRAVENOUS
Status: DISCONTINUED | OUTPATIENT
Start: 2020-10-26 | End: 2020-10-26

## 2020-10-26 RX ORDER — ONDANSETRON 2 MG/ML
4 INJECTION INTRAMUSCULAR; INTRAVENOUS ONCE AS NEEDED
Status: COMPLETED | OUTPATIENT
Start: 2020-10-26 | End: 2020-10-26

## 2020-10-26 RX ORDER — ONDANSETRON 2 MG/ML
INJECTION INTRAMUSCULAR; INTRAVENOUS
Status: DISCONTINUED | OUTPATIENT
Start: 2020-10-26 | End: 2020-10-26

## 2020-10-26 RX ORDER — OXYCODONE AND ACETAMINOPHEN 10; 325 MG/1; MG/1
1 TABLET ORAL EVERY 4 HOURS PRN
Status: DISCONTINUED | OUTPATIENT
Start: 2020-10-26 | End: 2020-10-27 | Stop reason: HOSPADM

## 2020-10-26 RX ADMIN — DOCUSATE SODIUM 100 MG: 100 CAPSULE, LIQUID FILLED ORAL at 11:10

## 2020-10-26 RX ADMIN — HYDROMORPHONE HYDROCHLORIDE 0.2 MG: 1 INJECTION, SOLUTION INTRAMUSCULAR; INTRAVENOUS; SUBCUTANEOUS at 10:10

## 2020-10-26 RX ADMIN — ROCURONIUM BROMIDE 50 MG: 10 INJECTION, SOLUTION INTRAVENOUS at 07:10

## 2020-10-26 RX ADMIN — DEXAMETHASONE SODIUM PHOSPHATE 8 MG: 4 INJECTION, SOLUTION INTRAMUSCULAR; INTRAVENOUS at 07:10

## 2020-10-26 RX ADMIN — FENTANYL CITRATE 50 MCG: 50 INJECTION, SOLUTION INTRAMUSCULAR; INTRAVENOUS at 07:10

## 2020-10-26 RX ADMIN — CEFAZOLIN 2 G: 1 INJECTION, POWDER, FOR SOLUTION INTRAMUSCULAR; INTRAVENOUS at 11:10

## 2020-10-26 RX ADMIN — SODIUM CHLORIDE, SODIUM GLUCONATE, SODIUM ACETATE, POTASSIUM CHLORIDE, MAGNESIUM CHLORIDE, SODIUM PHOSPHATE, DIBASIC, AND POTASSIUM PHOSPHATE: .53; .5; .37; .037; .03; .012; .00082 INJECTION, SOLUTION INTRAVENOUS at 07:10

## 2020-10-26 RX ADMIN — SUGAMMADEX 200 MG: 100 INJECTION, SOLUTION INTRAVENOUS at 09:10

## 2020-10-26 RX ADMIN — DEXTROSE AND SODIUM CHLORIDE: 5; .45 INJECTION, SOLUTION INTRAVENOUS at 10:10

## 2020-10-26 RX ADMIN — DOCUSATE SODIUM 100 MG: 100 CAPSULE, LIQUID FILLED ORAL at 08:10

## 2020-10-26 RX ADMIN — FAMOTIDINE 20 MG: 10 INJECTION, SOLUTION INTRAVENOUS at 09:10

## 2020-10-26 RX ADMIN — OXYCODONE AND ACETAMINOPHEN 1 TABLET: 10; 325 TABLET ORAL at 11:10

## 2020-10-26 RX ADMIN — VECURONIUM BROMIDE FOR INJECTION 5 MG: 1 INJECTION, POWDER, LYOPHILIZED, FOR SOLUTION INTRAVENOUS at 09:10

## 2020-10-26 RX ADMIN — FENTANYL CITRATE 50 MCG: 50 INJECTION, SOLUTION INTRAMUSCULAR; INTRAVENOUS at 09:10

## 2020-10-26 RX ADMIN — DEXMEDETOMIDINE HYDROCHLORIDE 40 MCG: 100 INJECTION, SOLUTION, CONCENTRATE INTRAVENOUS at 09:10

## 2020-10-26 RX ADMIN — VECURONIUM BROMIDE FOR INJECTION 5 MG: 1 INJECTION, POWDER, LYOPHILIZED, FOR SOLUTION INTRAVENOUS at 08:10

## 2020-10-26 RX ADMIN — FENTANYL CITRATE 25 MCG: 50 INJECTION, SOLUTION INTRAMUSCULAR; INTRAVENOUS at 10:10

## 2020-10-26 RX ADMIN — SODIUM CHLORIDE, SODIUM GLUCONATE, SODIUM ACETATE, POTASSIUM CHLORIDE, MAGNESIUM CHLORIDE, SODIUM PHOSPHATE, DIBASIC, AND POTASSIUM PHOSPHATE: .53; .5; .37; .037; .03; .012; .00082 INJECTION, SOLUTION INTRAVENOUS at 08:10

## 2020-10-26 RX ADMIN — CEFAZOLIN 2 G: 1 INJECTION, POWDER, FOR SOLUTION INTRAMUSCULAR; INTRAVENOUS at 04:10

## 2020-10-26 RX ADMIN — KETOROLAC TROMETHAMINE 15 MG: 30 INJECTION, SOLUTION INTRAMUSCULAR; INTRAVENOUS at 06:10

## 2020-10-26 RX ADMIN — OXYCODONE HYDROCHLORIDE AND ACETAMINOPHEN 1 TABLET: 5; 325 TABLET ORAL at 02:10

## 2020-10-26 RX ADMIN — Medication 20 MG: at 07:10

## 2020-10-26 RX ADMIN — DEXTROSE AND SODIUM CHLORIDE: 5; .45 INJECTION, SOLUTION INTRAVENOUS at 06:10

## 2020-10-26 RX ADMIN — HEPARIN SODIUM 5000 UNITS: 5000 INJECTION INTRAVENOUS; SUBCUTANEOUS at 08:10

## 2020-10-26 RX ADMIN — ONDANSETRON 4 MG: 2 INJECTION, SOLUTION INTRAMUSCULAR; INTRAVENOUS at 09:10

## 2020-10-26 RX ADMIN — CEFAZOLIN 2 G: 330 INJECTION, POWDER, FOR SOLUTION INTRAMUSCULAR; INTRAVENOUS at 07:10

## 2020-10-26 RX ADMIN — FUROSEMIDE 10 MG: 10 INJECTION, SOLUTION INTRAMUSCULAR; INTRAVENOUS at 09:10

## 2020-10-26 RX ADMIN — HYDROMORPHONE HYDROCHLORIDE 0.2 MG: 1 INJECTION, SOLUTION INTRAMUSCULAR; INTRAVENOUS; SUBCUTANEOUS at 11:10

## 2020-10-26 RX ADMIN — MIDAZOLAM HYDROCHLORIDE 2 MG: 1 INJECTION, SOLUTION INTRAMUSCULAR; INTRAVENOUS at 07:10

## 2020-10-26 RX ADMIN — SODIUM CHLORIDE: 0.9 INJECTION, SOLUTION INTRAVENOUS at 06:10

## 2020-10-26 RX ADMIN — HEPARIN SODIUM 5000 UNITS: 5000 INJECTION INTRAVENOUS; SUBCUTANEOUS at 06:10

## 2020-10-26 RX ADMIN — KETOROLAC TROMETHAMINE 15 MG: 30 INJECTION, SOLUTION INTRAMUSCULAR; INTRAVENOUS at 11:10

## 2020-10-26 RX ADMIN — ONDANSETRON 4 MG: 2 INJECTION INTRAMUSCULAR; INTRAVENOUS at 10:10

## 2020-10-26 RX ADMIN — PROPOFOL 150 MG: 10 INJECTION, EMULSION INTRAVENOUS at 07:10

## 2020-10-26 RX ADMIN — HEPARIN SODIUM 5000 UNITS: 5000 INJECTION INTRAVENOUS; SUBCUTANEOUS at 02:10

## 2020-10-26 RX ADMIN — LIDOCAINE HYDROCHLORIDE 75 MG: 20 INJECTION, SOLUTION INTRAVENOUS at 07:10

## 2020-10-26 RX ADMIN — Medication 20 MG: at 08:10

## 2020-10-26 RX ADMIN — ACETAMINOPHEN 1000 MG: 10 INJECTION, SOLUTION INTRAVENOUS at 09:10

## 2020-10-26 RX ADMIN — FENTANYL CITRATE 25 MCG: 50 INJECTION, SOLUTION INTRAMUSCULAR; INTRAVENOUS at 09:10

## 2020-10-26 NOTE — OP NOTE
Operative Report    Date of Procedure: 10/26/2020    Surgeons:  Surgeon(s) and Role:     * Angelica Arrieta MD - Primary     * Phu Pinon Jr., MD - Assisting    First Assistant Attestation:  The presence of an additional attending surgeon functioning as first assistant was required due to the complexity of the procedure relative to any available residents. I certify that no resident was available who was qualified to serve as first assistant. Duties performed by the assistant included assisting the primary surgeon.    Pre-operative Diagnosis: Living Kidney Donor  Post-operative Diagnosis: Same  Procedure(s) Performed:   Left Kidney  robotic donor nephrectomy    Anesthesia: General endotracheal    Preamble  Indications and Patient Counseling: The patient is a 40 y.o. year-old female who has been evaluated as a living kidney donor.  A left  robotic nephrectomy is planned.  The patient has been thoroughly informed regarding the risks of the procedure, including death, serious surgical complications, bleeding, and reoperation.  She has also been informed of the possible need for conversion to open surgery.  She is aware that kidney donation is completely voluntary, and denies any coercion or motive for donating other than a sincere desire to benefit the recipient.  The patient voices understanding and agrees to proceed with the planned procedure.    ABO Confirmation: Prior to proceeding with donor nephrectomy, a formal ABO confirmation was done according to hospital and UNOS policies.  I confirmed the UNOS ID number of the donor organ and the donor and recipient ABO types.  This confirmation was personally done by an attending surgeon and circulating nurse, and is officially documented elsewhere.    Time-Out: A complete time out was carried out prior to incision, with confirmation of patient identity, correct procedure, correct operative site, appropriate antibiotic prophylaxis, review of any known allergies,  and presence of all needed equipment.    Procedure in Detail  Following the induction of general endotracheal anesthesia, the patient was positioned in a right lateral decubitus position with the table flexed.  The abdomen and left flank were sterilely prepped and draped.  A balloon-tipped 12 mm laparoscopic port was introduced just below the umbilicus using an open Lewis technique.  The abdomen was then insufflated to 15 mmHg pressure.  Three additional ports were then placed consisting of a 12 mm port to the left of the umbilicus, an 8 mm da Mindy port just below the left costal margin near the mid axillary line and another 8 mm da Mindy port just medial to the anterior superior iliac spine.  The da Mindy robotic system was then docked to these ports.  Dissection was then carried out using the da Mindy system.  The colon was mobilized down to the pelvic brim.  Gerota's fascia was then opened and the left renal vein was identified.  Lumbar, gonadal, and adrenal tributaries were clipped and divided as appropriate.  The left renal artery was identified as proximally as feasible.  The adrenal gland was dissected away from the superior pole of the kidney.  The ureter was swept upwards off of the psoas muscle with care taken to avoid traumatizing it.  The posterior and lateral attachments of the kidney were then taken down.  An appropriately-sized vertical incision was created just below the umbilicus for extraction of the kidney.  This was extended just large enough to admit the surgeon's hand.  The Gelport device was placed at this point.  The abdomen was re-insufflated, and the kidney was inspected with conventional laparoscopy to ensure that all non-vascular attachments hand been taken down.  Additional cautery dissection was done as needed.  The patient was given 10 mg of Lasix to ensure a brisk diuresis.  The ureter was then divided distally using a vascular Endo-YAZAN stapler.  After this was done, the  renal  artery was divided with a vascular stapler followed by the renal vein.  The kidney was then removed through the Gelport and immediately placed on ice and flushed with ice cold UW solution. Attention was then directed to the operative field.  The operative field was thoroughly irrigated and assessed for hemostasis. The port sites were assessed for size of the fascial defect, and external suturing or a specifically designed closure product was used as appropriate.  The kidney extraction incision was closed with continuous #1 PDS.  All incisions were infiltrated with bupivicaine. Skin incisions were closed with 4-0 subcuticular Monocryl.  The patient was then taken from the Operating Room in satisfactory condition.  Prior to the conclusion of the procedure, I was told that all sponge, needle and instrument counts were correct.      Confirmation of Intended Recipient: Prior to the kidney leaving the donor operating room, the correct intended recipient was verified by the attending surgeon and the circulating nurse.      Estimated Blood Loss: 0.85 fl. oz. mL  Drains: None  Specimens: none    Findings:  Healthy-appearing kidney  Arterial anatomy: Single  Venous anatomy: Single  Ureteral anatomy: Single      Times:  Console start:  10/26/2020  8:07 AM  Console finish: 10/26/2020  8:43 AM  Artery divided:  10/26/2020  9:12 AM  Kidney on ice:  10/26/2020  9:13 AM  Flush begin:  10/26/2020  9:13 AM  Flush end:  10/26/2020  9:18 AM  Kidney out of room: 10/26/2020  9:52 AM

## 2020-10-26 NOTE — PLAN OF CARE
Recommendations  1. As medically appropriate, ADAT to regular.   2. RD to monitor.     Goals: Adequate PO intake to meet > 75% EEN/EPN by RD follow up  Nutrition Goal Status: new  Communication of RD Recs: other (comment)(POC)

## 2020-10-26 NOTE — PROGRESS NOTES
DONOR NEPHRECTOMY NOTE:    Jo Arnav is s/p donor nephrectomy on 10/26/20.  This patients medications prior to surgery were reviewed and restarted, as appropriate.

## 2020-10-26 NOTE — INTERVAL H&P NOTE
The patient has been examined and the H&P has been reviewed:    I concur with the findings and no changes have occurred since H&P was written.    Surgery risks, benefits and alternative options discussed and understood by patient/family.          Active Hospital Problems    Diagnosis  POA    Kidney donor [Z52.4]  Not Applicable      Resolved Hospital Problems   No resolved problems to display.

## 2020-10-26 NOTE — PLAN OF CARE
Patient in Pre-Op 10 awaiting update to H&P, anesthesia consent, and site marking. Type & screen extension form sent to [facility]. Will continue to monitor.

## 2020-10-26 NOTE — PROGRESS NOTES
Report called to Prabhakar BRICE, pt made ready for transport to [id number] via hosp bed per transporters, AA&O,VSS,resp unlabored,laps and incision intact,pt states pain 4/10 and tolerable, kwadwo po fluids,IV/soto intact, updated via messaging,stable at present, belongings sent with pt.

## 2020-10-26 NOTE — TRANSFER OF CARE
"Anesthesia Transfer of Care Note    Patient: Jo José    Procedure(s) Performed: Procedure(s) (LRB):  ROBOTIC NEPHRECTOMY, DONOR (Left)    Patient location: PACU    Anesthesia Type: general    Transport from OR: Transported from OR on 6-10 L/min O2 by face mask with adequate spontaneous ventilation    Post pain: adequate analgesia    Post assessment: no apparent anesthetic complications and tolerated procedure well    Post vital signs: stable    Level of consciousness: sedated    Nausea/Vomiting: no nausea/vomiting    Complications: none    Transfer of care protocol was followed      Last vitals:   Visit Vitals  BP (!) 83/49 (BP Location: Right arm, Patient Position: Lying)   Pulse 67   Temp 36.4 °C (97.5 °F) (Temporal)   Resp 18   Ht 5' 4" (1.626 m)   Wt 64.2 kg (141 lb 8.6 oz)   LMP 10/02/2020 (Approximate)   SpO2 100%   Breastfeeding No   BMI 24.29 kg/m²     "

## 2020-10-26 NOTE — PROGRESS NOTES
Admit Note     Met with patient to assess needs. Patient is a 40 y.o.  female, admitted for for living kidney donation (donor).      Patient admitted from home on 10/26/2020 .  At this time, patient presents as pleasant, good eye contact, well groomed, recall good, concentration/judgement good, average intelligence, calm, communicative, cooperative and asking and answering questions appropriately.  At this time, patients caregiver presents as at the [facility].    Household/Family Systems     Patient resides with patient's spouse and son, at [address] [address].  Support system includes pt's , son and large family.  Patient does have dependents that are in need of being cared for. Patient's son are being cared for by [facility].     Patients primary caregiver is Amador José, patients , phone number [phone].  Confirmed patients contact information is [phone] (home);   Telephone Information:   Mobile [phone]   .    During admission, patient's caregiver plans to stay at home.  Confirmed patient and patients caregivers do have access to reliable transportation.    Cognitive Status/Learning     Patient reports reading ability as college and states patient does not have difficulty with N/A.  Patient reports patient learns best by combination of verbal, written and hands on demonstration.   Needed: No.   Highest education level: Post-College Graduate Degree    Vocation/Disability   .  Working for Income: yes  If yes, working activity level: Working Full Time  Patient is employed as a Doctor.    Adherence     Patient reports a high level of adherence to patients health care regimen.  Adherence counseling and education provided. Patient verbalizes understanding.    Substance Use    Patient reports the following substance usage.    Tobacco: none, patient denies any use.  Alcohol: none, patient denies any use.  Illicit Drugs/Non-prescribed Medications:  none, patient denies any use.  Patient states clear understanding of the potential impact of substance use.  Substance abstinence/cessation counseling, education and resources provided and reviewed.     Services Utilizing/ADLS    Infusion Service: Prior to admission, patient utilizing? no  Home Health: Prior to admission, patient utilizing? no  DME: Prior to admission, no  Pulmonary/Cardiac Rehab: Prior to admission, no  Dialysis:  Prior to admission, no  Transplant Specialty Pharmacy:  Prior to admission, no; patient's caregiver provides permission to release necessary information to specialty pharmacy for medications post-transplant. Resources provided and patient is choosing [facility] pharmacy at this time. Pt reports using [facility] [place] as her local pharmacy.    Prior to admission, patient reports patient was independent with ADLS and was driving.  Patient reports patient is not able to care for self at this time due to compromised medical condition (as documented in medical record) and physical weakness..  Patient indicates a willingness to care for self once medically cleared to do so.    Insurance/Medications    Insured by   Payor/Plan Subscr  Sex Relation Sub. Ins. ID Effective Group Num   1. BLUE CROSS BL* NADEEM GRULLON 1977 Male  [id number] [birth year]/19 [id number]                                   [address] [address]      Primary Insurance (for UNOS reporting): Donation  Secondary Insurance (for UNOS reporting): Donation    Pt reports having her own insurance.    Patient reports patient is able to obtain and afford medications at this time and at time of discharge.    Living Will/Healthcare Power of     Patient states patient does not have a LW and/or HCPA.   provided education regarding LW and HCPA and the completion of forms.    Coping/Mental Health    Patient is coping adequately with the aid of  family members. Pt reports doing well.  Patient denies mental  health difficulties.     Discharge Planning    At time of discharge, patient plans to return to [facility] [address] under the care of pt's .  Patients  will transport patient.  Per rounds today, expected discharge date is tomorrow, tentatively. Patient and patients caregiver  verbalize understanding and are involved in treatment planning and discharge process.    Additional Concerns    Patient is being followed for needs, education, resources, information, emotional support, supportive counseling, and for supportive and skilled discharge plan of care.  remains available. Patient denies additional needs and/or concerns at this time. Patient verbalizes understanding and agreement with information reviewed, social work availability, and how to access available resources as needed.

## 2020-10-26 NOTE — ANESTHESIA PREPROCEDURE EVALUATION
10/26/2020  Jo José is a 40 y.o., female.    Anesthesia Evaluation          Review of Systems  Anesthesia Hx:  No problems with previous Anesthesia    Social:  Non-Smoker    Cardiovascular:   Exercise tolerance: good Denies Hypertension.  Denies CAD.     Denies Angina.  Functional Capacity Can you climb two flights of stairs? ==> Yes    Pulmonary:   Denies Asthma.  Denies Recent URI.  Denies Sleep Apnea.    Renal/:  Renal/ Normal     Hepatic/GI:   Denies PUD. Denies Hiatal Hernia.  Denies GERD. Denies Liver Disease.  Denies Hepatitis.    Neurological:   Denies CVA. Denies Seizures.    Endocrine:   Denies Diabetes. Denies Hypothyroidism.        Physical Exam  General:  Well nourished    Airway/Jaw/Neck:  Airway Findings: Mouth Opening: Normal Tongue: Normal  General Airway Assessment: Adult  Mallampati: I  TM Distance: Normal, at least 6 cm  Jaw/Neck Findings:  Neck ROM: Normal ROM      Dental:  Dental Findings: In tact             Anesthesia Plan  Type of Anesthesia, risks & benefits discussed:  Anesthesia Type:  general  Patient's Preference: Proceed with anesthesia understanding that the risks are very small but could be serious or life threatening.  Intra-op Monitoring Plan: standard ASA monitors  Intra-op Monitoring Plan Comments:   Post Op Pain Control Plan:   Post Op Pain Control Plan Comments:   Induction:   IV  Beta Blocker:  Patient is not currently on a Beta-Blocker (No further documentation required).       Informed Consent: Patient understands risks and agrees with Anesthesia plan.  Questions answered. Anesthesia consent signed with patient.  ASA Score: 1     Day of Surgery Review of History & Physical: I have interviewed and examined the patient. I have reviewed the patient's H&P dated:            Ready For Surgery From Anesthesia Perspective.

## 2020-10-26 NOTE — CONSULTS
"  [facility]  Adult Nutrition  Consult Note    SUMMARY     Recommendations  1. As medically appropriate, ADAT to regular.   2. RD to monitor.    Goals: Adequate PO intake to meet > 75% EEN/EPN by RD follow up  Nutrition Goal Status: new  Communication of RD Recs: other (comment)(POC)    Reason for Assessment    Reason For Assessment: consult  Diagnosis: (admitted for donor nephrectomy)  Relevant Medical History: none  General Information Comments: Pt resting in bed with no family at bedside this afternoon. She endorses good appetite and stable wt PTA, -145#. NFPE not indicated, pt with no indicators of malnutrition at this time. Encouraged increased intake of protein and fluids. Pt voiced understanding.  Nutrition Discharge Planning: Provided patient with post-surgery/nephrectomy recommendations. General healthy diet encouraged. Increased protein and fluid consumption recommended. No other needs identified.    Nutrition Risk Screen         Nutrition/Diet History    Spiritual, Cultural Beliefs, Jain Practices, Values that Affect Care: no    Anthropometrics    Temp: 97.8 °F (36.6 °C)  Height Method: Stated  Height: 5' 4" (162.6 cm)  Height (inches): 64 in  Weight Method: Standard Scale  Weight: 64.2 kg (141 lb 8.6 oz)  Weight (lb): 141.54 lb  Ideal Body Weight (IBW), Female: 120 lb  % Ideal Body Weight, Female (lb): 117.95 %  BMI (Calculated): 24.3    Lab/Procedures/Meds    Pertinent Labs Reviewed: reviewed  Pertinent Labs Comments: CO2 21  Pertinent Medications Reviewed: reviewed  Pertinent Medications Comments: docusate, heparin    Estimated/Assessed Needs    Weight Used For Calorie Calculations: 64.2 kg (141 lb 8.6 oz)  Energy Calorie Requirements (kcal): 6725-4010 kcal/day  Energy Need Method: Kcal/kg(25-30)  Protein Requirements: 77-97 g/day(1.2-1.5 g/kg)  Weight Used For Protein Calculations: 64.2 kg (141 lb 8.6 oz)  Fluid Requirements (mL): per MD or 1 mL/kcal     RDA Method " (mL): 1605    Nutrition Prescription Ordered    Current Diet Order: Clear liquid    Evaluation of Received Nutrient/Fluid Intake    Comments: LBM 10/25  % Intake of Estimated Energy Needs: 0 - 25 %  % Meal Intake: PILLO, s/p surgery this AM, diet recently advanced    Nutrition Risk    Level of Risk/Frequency of Follow-up: low     Assessment and Plan    Nutrition Problem  Increased nutrient needs    Related to (etiology):   Physiological causes 2/2 healing    Signs and Symptoms (as evidenced by):   S/p donor nephrectomy 10/26    Interventions (treatment strategy):  Collaboration with other providers  Nutrition education    Nutrition Diagnosis Status:   New     Monitor and Evaluation    Food and Nutrient Intake: energy intake, food and beverage intake  Food and Nutrient Adminstration: diet order  Knowledge/Beliefs/Attitudes: food and nutrition knowledge/skill  Anthropometric Measurements: weight, weight change, body mass index  Biochemical Data, Medical Tests and Procedures: electrolyte and renal panel, gastrointestinal profile, glucose/endocrine profile, inflammatory profile, lipid profile  Nutrition-Focused Physical Findings: overall appearance     Nutrition Follow-Up    RD Follow-up?: Yes

## 2020-10-26 NOTE — OP NOTE
Certification of Assistant at Surgery       Surgery Date: 10/26/2020     Participating Surgeons:  Surgeon(s) and Role:     * Angelica Arrieta MD - Primary     * Phu Pinon Jr., MD - Assisting    Procedures:  Procedure(s) (LRB):  ROBOTIC NEPHRECTOMY, DONOR (Left)    Assistant Surgeon's Certification of Necessity:  I understand that section 1842 (b) (6) (d) of the Social Security Act generally prohibits Medicare Part B reasonable charge payment for the services of assistants at surgery in teaching hospitals when qualified residents are available to furnish such services. I certify that the services for which payment is claimed were medically necessary, and that no qualified resident was available to perform the services. I further understand that these services are subject to post-payment review by the Medicare carrier.      Phu Pinon Jr, MD    10/26/2020  10:31 AM

## 2020-10-26 NOTE — ANESTHESIA PROCEDURE NOTES
Intubation  Performed by: Sergey Mays CRNA  Authorized by: Mac Carpenter MD     Intubation:     Induction:  Intravenous    Intubated:  Postinduction    Mask Ventilation:  Easy mask    Attempts:  1    Attempted By:  CRNA    Blade:  Beltran 2    Laryngeal View Grade: Grade I - full view of chords      Difficult Airway Encountered?: No      Complications:  None    Airway Device:  Oral endotracheal tube    Airway Device Size:  7.0    Style/Cuff Inflation:  Cuffed (inflated to minimal occlusive pressure)    Inflation Amount (mL):  5    Tube secured:  21    Secured at:  The lips    Placement Verified By:  Capnometry    Complicating Factors:  None    Findings Post-Intubation:  BS equal bilateral

## 2020-10-26 NOTE — NURSING
Transferred from PACU via stretcher, s/p donor nephrectomy. See doc flowsheet for VS/ assessment. Oriented to room and made comfortable.

## 2020-10-26 NOTE — ANESTHESIA POSTPROCEDURE EVALUATION
Anesthesia Post Evaluation    Patient: Jo José    Procedure(s) Performed: Procedure(s) (LRB):  ROBOTIC NEPHRECTOMY, DONOR (Left)    Final Anesthesia Type: general    Patient location during evaluation: PACU  Patient participation: Yes- Able to Participate  Level of consciousness: awake and alert  Post-procedure vital signs: reviewed and stable  Pain management: adequate  Airway patency: patent    PONV status at discharge: No PONV  Anesthetic complications: no      Cardiovascular status: blood pressure returned to baseline and hemodynamically stable  Respiratory status: spontaneous ventilation  Hydration status: euvolemic  Follow-up not needed.          Vitals Value Taken Time   /62 10/26/20 1217   Temp 36.5 °C (97.7 °F) 10/26/20 1130   Pulse 72 10/26/20 1217   Resp 19 10/26/20 1217   SpO2 98 % 10/26/20 1217   Vitals shown include unvalidated device data.      No case tracking events are documented in the log.      Pain/Sulema Score: Pain Rating Prior to Med Admin: 6 (10/26/2020 11:38 AM)

## 2020-10-26 NOTE — PROGRESS NOTES
MANISH to attempted to reach pt's caregiver, Amber José ([facility]) [phone] over the phone twice and in person in the waiting room. MANISH was unable to reach and left voicemail to complete admit note.     MANISH remains available at [phone].

## 2020-10-27 VITALS
SYSTOLIC BLOOD PRESSURE: 119 MMHG | HEART RATE: 114 BPM | DIASTOLIC BLOOD PRESSURE: 72 MMHG | BODY MASS INDEX: 25.6 KG/M2 | HEIGHT: 64 IN | RESPIRATION RATE: 24 BRPM | TEMPERATURE: 98 F | OXYGEN SATURATION: 98 % | WEIGHT: 149.94 LBS

## 2020-10-27 LAB
ALBUMIN SERPL BCP-MCNC: 3 G/DL (ref 3.5–5.2)
ANION GAP SERPL CALC-SCNC: 6 MMOL/L (ref 8–16)
BASOPHILS # BLD AUTO: 0.04 K/UL (ref 0–0.2)
BASOPHILS NFR BLD: 0.3 % (ref 0–1.9)
BUN SERPL-MCNC: 8 MG/DL (ref 6–20)
CALCIUM SERPL-MCNC: 7.5 MG/DL (ref 8.7–10.5)
CHLORIDE SERPL-SCNC: 106 MMOL/L (ref 95–110)
CO2 SERPL-SCNC: 25 MMOL/L (ref 23–29)
CREAT SERPL-MCNC: 1.1 MG/DL (ref 0.5–1.4)
DIFFERENTIAL METHOD: ABNORMAL
EOSINOPHIL # BLD AUTO: 0 K/UL (ref 0–0.5)
EOSINOPHIL NFR BLD: 0 % (ref 0–8)
ERYTHROCYTE [DISTWIDTH] IN BLOOD BY AUTOMATED COUNT: 16.3 % (ref 11.5–14.5)
EST. GFR  (AFRICAN AMERICAN): >60 ML/MIN/1.73 M^2
EST. GFR  (NON AFRICAN AMERICAN): >60 ML/MIN/1.73 M^2
GLUCOSE SERPL-MCNC: 93 MG/DL (ref 70–110)
HCT VFR BLD AUTO: 31.3 % (ref 37–48.5)
HGB BLD-MCNC: 9.5 G/DL (ref 12–16)
IMM GRANULOCYTES # BLD AUTO: 0.04 K/UL (ref 0–0.04)
IMM GRANULOCYTES NFR BLD AUTO: 0.3 % (ref 0–0.5)
LYMPHOCYTES # BLD AUTO: 2.4 K/UL (ref 1–4.8)
LYMPHOCYTES NFR BLD: 19.6 % (ref 18–48)
MCH RBC QN AUTO: 23 PG (ref 27–31)
MCHC RBC AUTO-ENTMCNC: 30.4 G/DL (ref 32–36)
MCV RBC AUTO: 76 FL (ref 82–98)
MONOCYTES # BLD AUTO: 0.7 K/UL (ref 0.3–1)
MONOCYTES NFR BLD: 5.4 % (ref 4–15)
NEUTROPHILS # BLD AUTO: 9 K/UL (ref 1.8–7.7)
NEUTROPHILS NFR BLD: 74.4 % (ref 38–73)
NRBC BLD-RTO: 0 /100 WBC
PHOSPHATE SERPL-MCNC: 2.3 MG/DL (ref 2.7–4.5)
PLATELET # BLD AUTO: 262 K/UL (ref 150–350)
PMV BLD AUTO: 10.5 FL (ref 9.2–12.9)
POTASSIUM SERPL-SCNC: 3.7 MMOL/L (ref 3.5–5.1)
RBC # BLD AUTO: 4.13 M/UL (ref 4–5.4)
SODIUM SERPL-SCNC: 137 MMOL/L (ref 136–145)
WBC # BLD AUTO: 12.09 K/UL (ref 3.9–12.7)

## 2020-10-27 PROCEDURE — 36415 COLL VENOUS BLD VENIPUNCTURE: CPT

## 2020-10-27 PROCEDURE — 80069 RENAL FUNCTION PANEL: CPT

## 2020-10-27 PROCEDURE — 85025 COMPLETE CBC W/AUTO DIFF WBC: CPT

## 2020-10-27 PROCEDURE — 25000003 PHARM REV CODE 250: Performed by: PHYSICIAN ASSISTANT

## 2020-10-27 PROCEDURE — 25000003 PHARM REV CODE 250: Performed by: TRANSPLANT SURGERY

## 2020-10-27 PROCEDURE — S5010 5% DEXTROSE AND 0.45% SALINE: HCPCS | Performed by: TRANSPLANT SURGERY

## 2020-10-27 PROCEDURE — 99239 HOSP IP/OBS DSCHRG MGMT >30: CPT | Mod: ,,, | Performed by: PHYSICIAN ASSISTANT

## 2020-10-27 PROCEDURE — 63600175 PHARM REV CODE 636 W HCPCS: Performed by: TRANSPLANT SURGERY

## 2020-10-27 PROCEDURE — 99239 PR HOSPITAL DISCHARGE DAY,>30 MIN: ICD-10-PCS | Mod: ,,, | Performed by: PHYSICIAN ASSISTANT

## 2020-10-27 RX ORDER — BISACODYL 5 MG
5 TABLET, DELAYED RELEASE (ENTERIC COATED) ORAL DAILY PRN
Refills: 0 | COMMUNITY
Start: 2020-10-27

## 2020-10-27 RX ORDER — ONDANSETRON 4 MG/1
8 TABLET, FILM COATED ORAL EVERY 8 HOURS PRN
Qty: 6 TABLET | Refills: 0 | Status: SHIPPED | OUTPATIENT
Start: 2020-10-27

## 2020-10-27 RX ORDER — DOCUSATE SODIUM 100 MG/1
100 CAPSULE, LIQUID FILLED ORAL 3 TIMES DAILY PRN
Refills: 0 | COMMUNITY
Start: 2020-10-27

## 2020-10-27 RX ORDER — ACETAMINOPHEN 325 MG/1
650 TABLET ORAL EVERY 6 HOURS PRN
Refills: 0 | COMMUNITY
Start: 2020-10-27

## 2020-10-27 RX ORDER — OXYCODONE HYDROCHLORIDE 5 MG/1
5 TABLET ORAL EVERY 6 HOURS PRN
Qty: 28 TABLET | Refills: 0 | Status: SHIPPED | OUTPATIENT
Start: 2020-10-27

## 2020-10-27 RX ADMIN — DIBASIC SODIUM PHOSPHATE, MONOBASIC POTASSIUM PHOSPHATE AND MONOBASIC SODIUM PHOSPHATE 2 TABLET: 852; 155; 130 TABLET ORAL at 09:10

## 2020-10-27 RX ADMIN — DEXTROSE AND SODIUM CHLORIDE: 5; .45 INJECTION, SOLUTION INTRAVENOUS at 01:10

## 2020-10-27 RX ADMIN — DOCUSATE SODIUM 100 MG: 100 CAPSULE, LIQUID FILLED ORAL at 08:10

## 2020-10-27 RX ADMIN — CEFAZOLIN 2 G: 1 INJECTION, POWDER, FOR SOLUTION INTRAMUSCULAR; INTRAVENOUS at 08:10

## 2020-10-27 RX ADMIN — KETOROLAC TROMETHAMINE 15 MG: 30 INJECTION, SOLUTION INTRAMUSCULAR; INTRAVENOUS at 11:10

## 2020-10-27 RX ADMIN — KETOROLAC TROMETHAMINE 15 MG: 30 INJECTION, SOLUTION INTRAMUSCULAR; INTRAVENOUS at 05:10

## 2020-10-27 RX ADMIN — OXYCODONE HYDROCHLORIDE AND ACETAMINOPHEN 1 TABLET: 5; 325 TABLET ORAL at 10:10

## 2020-10-27 RX ADMIN — OXYCODONE HYDROCHLORIDE AND ACETAMINOPHEN 1 TABLET: 5; 325 TABLET ORAL at 05:10

## 2020-10-27 RX ADMIN — DEXTROSE AND SODIUM CHLORIDE: 5; .45 INJECTION, SOLUTION INTRAVENOUS at 09:10

## 2020-10-27 RX ADMIN — HEPARIN SODIUM 5000 UNITS: 5000 INJECTION INTRAVENOUS; SUBCUTANEOUS at 05:10

## 2020-10-27 NOTE — PROGRESS NOTES
Discharge Note:    Pt was alert and oriented x4, sitting up in bed and in high spirits. Pt presented alone. Patients primary caregiver is Amador Castro José, patients , phone number [phone]  SW met with pt to assess discharge plan and any concerns or needs.    Pt reports agreeing with the discharge plan and has no psychosocial concerns. Pt will discharge today to local [facility] with no needs. Pt's  will transport patient.  Patient verbalizes understanding and is involved in treatment planning and discharge process. Pt denied concerns or questions.    MANISH remains available at [phone].

## 2020-10-27 NOTE — NURSING
KIDNEY DONOR DISCHARGE INSTRUCTIONS    1. No heavy lifting (greater than 10-15 pounds) for six weeks.  2. Showers only, for the first two weeks after surgery.   You can take a tub bath after two weeks or when your incision is completely healed.     3. Clean the incision in the shower with a mild soap and water, rinse and dry.   4.   Call the outpatient kidney transplant coordinator Monday through Friday 8:00 a.m. - 4:30 p.m. at  [phone] or [phone], ext. [phone] if calling long distance.  After hours, on weekends  and holidays call [phone] or [phone] and you will be directed to [facility] RN On Call  [facility] for the following:    Signs and symptoms of wound infection  · Redness and/or swelling of the wound  · Drainage from the wound  · Temperature > 101.0 F   · Wound opening or separation     Signs and symptoms of urinary tract infection  · Frequency of urination or problems urinating  · Burning during urination  · Cloudy or bloody urine  · Foul smelling urine  · Temperature > 101.0  Any other medical problems in the immediate discharge period which are of concern to you.    4. You will need to see the transplant doctor approximately four weeks after discharge.  Blood and urine specimens will be obtained two hours prior to your appointment.  If you did not receive the appointments upon discharge you can schedule your appointments by calling [phone] or [phone], ext. [phone].   5. Discuss with the doctor at your clinic appointment when you can return to driving and work.  If all goes well, usually this is within 4 to 6 weeks.    6. Six months after donating your kidney, you will again need lab work and a checkup with your doctor. As well as on a yearly basis.  Our office will need copies of these records for the first 2 years after donation.  Please have your doctor fax them to us at [phone].              *YOU SHOULD ALWAYS HAVE YEARLY MEDICAL CHECKUPS WITH YOUR LOCAL PHYSICIAN,  INCLUDING BLOOD WORK TO EVALUATE YOUR KIDNEY FUNCTION.   *Avoid Advil. Motrin, Aleive and other Non-Steriodals, these can be toxic to your kidney.    *Tylenol is okay.              Dear Kidney Donor,    Thank you so much for your heroic gift.  One never really knows, if they will be called to be a hero and whether or not they will be able to. You have accomplished this in our eyes, as well as, your recipients.  This is truly an amazing gift that you have given.    At discharge, you will be given a follow up appointment for lab work and a surgical visit which will occur about 4 weeks after donation.  At this appointment you will discuss with the physician when you will be able to return to normal activities, including work, as well as any other concerns you may have.      In order to ensure your health after donation, as well the health of future donors, we will need to do some routine follow up.  This will entail a call from our transplant office at   6 months, 1 year and 2 years after donation.   We are required to send information to [employer] ([employer]) on your progress and health at these intervals.    We will inquire about the followin. Current weight  2. Diagnostic tests done since our last call (CT scan, MRI, Ultrasound)  3. Lab work results including a creatinine and urinalysis  4. Blood Pressure reading  5. Any new medical conditions such as kidney problems, diabetes or hypertension  6. Admission to the hospital, if so, for what reason     It is very important that after donation you establish with a Primary Care Physician to maintain your health.  We ask that you see your physician at 6 months after donation, and then yearly.  These visits should include a complete physical exam, as well as blood work, including complete chemistries and urinalysis.  Please have your physician fax these lab results and clinic notes to us at [phone].    If there is anything we can help you with  please call us at [phone].  We sincerely hope your donation experience was a pleasant one and wish you good health and well being.        Your Transplant Team at [facility]

## 2020-10-27 NOTE — DISCHARGE SUMMARY
[facility]  Kidney Transplant  Discharge Summary    Patient Name: Jo José  MRN: [id number]  Admission Date: 10/26/2020  Hospital Length of Stay: 1 days  Discharge Date and Time:  10/27/2020 12:55 PM  Attending Physician: Angelica Eastman MD   Discharging Provider: Tonya Tavares PA-C  Primary Care Provider: Primary Doctor Anushka    HPI:   Ms. Jo José is a 40 yr F who presents to be a living kidney donor. Patient now s/p donor nephrectomy on 10/26. She is progressing well post-op. Menendez out and pt voiding without difficulties. Tolerating diet and ambulating without issues. Pain well tolerated. Patient is stable and ready for discharge. She has no complaints at this time. Will plan for repeat follow-up labs on 10/30/20.     Procedure(s) (LRB):  ROBOTIC NEPHRECTOMY, DONOR (Left)         Final Active Diagnoses:    Diagnosis Date Noted POA    PRINCIPAL PROBLEM:  Kidney donor [Z52.4] 10/26/2020 Not Applicable      Problems Resolved During this Admission:       Treatments: as above    Consults (From admission, onward)        Status Ordering Provider     Inpatient consult to Registered Dietitian/Nutritionist  Once     Provider:  (Not yet assigned)    Completed ANGELICA EASTMAN          Pending Diagnostic Studies:     None        Significant Diagnostic Studies: Labs:   CMP   Recent Labs   Lab 10/27/20  0638      K 3.7      CO2 25   GLU 93   BUN 8   CREATININE 1.1   CALCIUM 7.5*   ALBUMIN 3.0*   ANIONGAP 6*   ESTGFRAFRICA >60.0   EGFRNONAA >60.0   , CBC   Recent Labs   Lab 10/27/20  0638   WBC 12.09   HGB 9.5*   HCT 31.3*       and All labs within the past 24 hours have been reviewed    Discharged Condition: good    Disposition: Home or Self Care    Follow Up:    Patient Instructions:      Diet Adult Regular     Notify your health care provider if you experience any of the following:  temperature >100.4     Notify your health care provider if you experience any of the following:   persistent nausea and vomiting or diarrhea     Notify your health care provider if you experience any of the following:  severe uncontrolled pain     Notify your health care provider if you experience any of the following:  redness, tenderness, or signs of infection (pain, swelling, redness, odor or green/yellow discharge around incision site)     Notify your health care provider if you experience any of the following:  difficulty breathing or increased cough     Notify your health care provider if you experience any of the following:  severe persistent headache     Notify your health care provider if you experience any of the following:  persistent dizziness, light-headedness, or visual disturbances     Notify your health care provider if you experience any of the following:  increased confusion or weakness     Activity as tolerated     Medications:  Reconciled Home Medications:      Medication List      START taking these medications    acetaminophen 325 MG tablet  Commonly known as: TYLENOL  Take 2 tablets (650 mg total) by mouth every 6 (six) hours as needed for Pain.     bisacodyL 5 mg EC tablet  Commonly known as: DULCOLAX  Take 1 tablet (5 mg total) by mouth daily as needed for Constipation.     docusate sodium 100 MG capsule  Commonly known as: COLACE  Take 1 capsule (100 mg total) by mouth 3 (three) times daily as needed for Constipation.     ondansetron 4 MG tablet  Commonly known as: ZOFRAN  Take 2 tablets (8 mg total) by mouth every 8 (eight) hours as needed for Nausea.     oxyCODONE 5 MG immediate release tablet  Commonly known as: ROXICODONE  Take 1 tablet (5 mg total) by mouth every 6 (six) hours as needed for Pain.          Time spent caring for patient (Greater than 1/2 spent in direct face-to-face contact): > 30 minutes    Tonya Tavares PA-C  Kidney Transplant  [facility]

## 2020-10-27 NOTE — NURSING
Menendez removed per order @1181. Pt understands the importance of measuring her urine. Will continue to monitor.

## 2020-10-27 NOTE — PROGRESS NOTES
DONOR NEPHRECTOMY EDUCATION & DISCHARGE NOTE:    Discharge medications are to include pain control as acetaminophen and oxycodone and Colace/Dulcolax while taking pain medications to prevent constipation.  Patient was counseled at discharged regarding the side effects of pain medication, including drowsiness, constipation, nausea and counseled not to operate a car while taking pain medication or take Tylenol (acetaminophen) containing medications while taking pain medication.  Patient verbalized understanding.

## 2020-10-27 NOTE — NURSING
Patient ready for dc from TSU, printed AVS reviewed along with followup appts. Printed dc instructions have been provided/ reviewed by tx coordinator. Home meds have been delivered to bedside. Patient verbalizes understanding of dc instructions.  to transport home from hospital

## 2020-10-27 NOTE — PLAN OF CARE
Plan of care reviewed on am rounds, afrebrile, vss wbc 12  h/h stable Cr 1.1 Phos 2.3 ( replaced)  lap sites healing with dermabond intact , incisional pain controlled with prn Oxy // Toradol  Up ambulating in room and halls with standby assist. Voiding without problems since soto dcd.Passing flatus but no bm yet.Tolerating diet without n/v, plan for afternoon discharge

## 2020-10-27 NOTE — PLAN OF CARE
AAOx3, afebrile, c/o pain. Pt just wanting scheduled IV Toradol at this time.  IS done by pt. Soto in place with clear yellow urine. Plan to remove soto at 0600 per orders. D51/2 @ 125cc/hr. Hematocrit monitored q6h. IV ancef continued. Lap sites with dermabond. Pt ambulated in the hallways post op. Pt stand by assist OOB.  Pt able to position self independently in bed. JOHNATHAN/SCDs on pt. Pt in lowest position, side rails up x2, non-skid foot wear in place, call light within reach, pt verbalized understanding to call RN when needed.  Hand hygiene practiced per protocol.  Will continue to monitor.

## 2020-10-27 NOTE — PLAN OF CARE
Plan of care reviewed upon transfer from PACU. Lap sites intact with dermabond, hct 32.5  incisional pain controlled with prn Oxy/ scheduled Toradol, dangled at bs and assisted to chair, tolerated well.urine output good via soto, IVF in progress, tolerating small amount clears, diet to be advance in am, C and DB with encouragement, IS at bs and instructed on use. pleasant and cooperative with care, emotional support provided throughout shift.

## 2020-10-30 ENCOUNTER — OFFICE VISIT (OUTPATIENT)
Dept: TRANSPLANT | Facility: CLINIC | Age: 40
End: 2020-10-30
Payer: COMMERCIAL

## 2020-10-30 ENCOUNTER — CLINICAL SUPPORT (OUTPATIENT)
Dept: TRANSPLANT | Facility: CLINIC | Age: 40
End: 2020-10-30
Payer: COMMERCIAL

## 2020-10-30 ENCOUNTER — LAB VISIT (OUTPATIENT)
Dept: LAB | Facility: HOSPITAL | Age: 40
End: 2020-10-30
Attending: TRANSPLANT SURGERY
Payer: COMMERCIAL

## 2020-10-30 VITALS
WEIGHT: 141.31 LBS | DIASTOLIC BLOOD PRESSURE: 69 MMHG | SYSTOLIC BLOOD PRESSURE: 113 MMHG | OXYGEN SATURATION: 97 % | HEIGHT: 64 IN | RESPIRATION RATE: 16 BRPM | HEART RATE: 92 BPM | BODY MASS INDEX: 24.13 KG/M2 | TEMPERATURE: 98 F

## 2020-10-30 VITALS
TEMPERATURE: 98 F | OXYGEN SATURATION: 97 % | WEIGHT: 141.31 LBS | DIASTOLIC BLOOD PRESSURE: 69 MMHG | HEART RATE: 92 BPM | SYSTOLIC BLOOD PRESSURE: 113 MMHG | BODY MASS INDEX: 24.13 KG/M2 | HEIGHT: 64 IN | RESPIRATION RATE: 16 BRPM

## 2020-10-30 DIAGNOSIS — Z52.4 KIDNEY DONOR: Primary | ICD-10-CM

## 2020-10-30 DIAGNOSIS — Z52.4 KIDNEY DONOR: ICD-10-CM

## 2020-10-30 LAB
ALBUMIN SERPL BCP-MCNC: 3.2 G/DL (ref 3.5–5.2)
ANION GAP SERPL CALC-SCNC: 11 MMOL/L (ref 8–16)
BASOPHILS # BLD AUTO: 0.04 K/UL (ref 0–0.2)
BASOPHILS NFR BLD: 0.6 % (ref 0–1.9)
BUN SERPL-MCNC: 11 MG/DL (ref 6–20)
CALCIUM SERPL-MCNC: 8.9 MG/DL (ref 8.7–10.5)
CHLORIDE SERPL-SCNC: 101 MMOL/L (ref 95–110)
CO2 SERPL-SCNC: 24 MMOL/L (ref 23–29)
CREAT SERPL-MCNC: 1 MG/DL (ref 0.5–1.4)
DIFFERENTIAL METHOD: ABNORMAL
EOSINOPHIL # BLD AUTO: 0.2 K/UL (ref 0–0.5)
EOSINOPHIL NFR BLD: 3.7 % (ref 0–8)
ERYTHROCYTE [DISTWIDTH] IN BLOOD BY AUTOMATED COUNT: 15.8 % (ref 11.5–14.5)
EST. GFR  (AFRICAN AMERICAN): >60 ML/MIN/1.73 M^2
EST. GFR  (NON AFRICAN AMERICAN): >60 ML/MIN/1.73 M^2
GLUCOSE SERPL-MCNC: 90 MG/DL (ref 70–110)
HCT VFR BLD AUTO: 34.3 % (ref 37–48.5)
HGB BLD-MCNC: 10.1 G/DL (ref 12–16)
IMM GRANULOCYTES # BLD AUTO: 0.02 K/UL (ref 0–0.04)
IMM GRANULOCYTES NFR BLD AUTO: 0.3 % (ref 0–0.5)
LYMPHOCYTES # BLD AUTO: 1.6 K/UL (ref 1–4.8)
LYMPHOCYTES NFR BLD: 23.8 % (ref 18–48)
MCH RBC QN AUTO: 22.6 PG (ref 27–31)
MCHC RBC AUTO-ENTMCNC: 29.4 G/DL (ref 32–36)
MCV RBC AUTO: 77 FL (ref 82–98)
MONOCYTES # BLD AUTO: 0.4 K/UL (ref 0.3–1)
MONOCYTES NFR BLD: 6.5 % (ref 4–15)
NEUTROPHILS # BLD AUTO: 4.2 K/UL (ref 1.8–7.7)
NEUTROPHILS NFR BLD: 65.1 % (ref 38–73)
NRBC BLD-RTO: 0 /100 WBC
PHOSPHATE SERPL-MCNC: 2.6 MG/DL (ref 2.7–4.5)
PLATELET # BLD AUTO: 294 K/UL (ref 150–350)
PMV BLD AUTO: 9.8 FL (ref 9.2–12.9)
POTASSIUM SERPL-SCNC: 4.1 MMOL/L (ref 3.5–5.1)
RBC # BLD AUTO: 4.46 M/UL (ref 4–5.4)
SODIUM SERPL-SCNC: 136 MMOL/L (ref 136–145)
WBC # BLD AUTO: 6.51 K/UL (ref 3.9–12.7)

## 2020-10-30 PROCEDURE — 36415 COLL VENOUS BLD VENIPUNCTURE: CPT

## 2020-10-30 PROCEDURE — 85025 COMPLETE CBC W/AUTO DIFF WBC: CPT

## 2020-10-30 PROCEDURE — 99999 PR PBB SHADOW E&M-EST. PATIENT-LVL III: CPT | Mod: PBBFAC,,,

## 2020-10-30 PROCEDURE — 80069 RENAL FUNCTION PANEL: CPT

## 2020-10-30 PROCEDURE — 99999 PR PBB SHADOW E&M-EST. PATIENT-LVL III: ICD-10-PCS | Mod: PBBFAC,,,

## 2020-10-30 PROCEDURE — 99213 PR OFFICE/OUTPT VISIT, EST, LEVL III, 20-29 MIN: ICD-10-PCS | Mod: 24,S$PBB,ICN, | Performed by: TRANSPLANT SURGERY

## 2020-10-30 PROCEDURE — 99213 OFFICE O/P EST LOW 20 MIN: CPT | Mod: 24,S$PBB,ICN, | Performed by: TRANSPLANT SURGERY

## 2020-10-30 NOTE — PROGRESS NOTES
Transplant Surgery Kidney Donor Follow-up    Chief Complaint: Jo José is a 40 y.o. year old female who  Underwent left robotic/laparoscopic donor nephrectomy on .  She presents for surgical follow-up.  Current symptoms include minimal incisional pain.     Review of Systems   Respiratory: Negative.    Cardiovascular: Negative.  Negative for leg swelling.   Gastrointestinal: Negative for abdominal distention and abdominal pain.   Genitourinary: Negative.    All other systems reviewed and are negative.    Physical Exam  Vitals signs reviewed.   Constitutional:       Appearance: Normal appearance.   Abdominal:          Comments: All incision are clean and healing appropriately    Neurological:      Mental Status: She is alert.       Lab Results   Component Value Date    BUN 8 10/27/2020    CREATININE 1.1 10/27/2020     Lab Results   Component Value Date    WBC 12.09 10/27/2020    HGB 9.5 (L) 10/27/2020    HCT 31.3 (L) 10/27/2020     10/27/2020       Assessment and Plan:  1. Kidney donor        Jo is doing well following living kidney donation.  She  is advised to refrain from heavy lifting for a period of two to four weeks from the date of surgery and then gradually resume normal activities. She will return for an additional follow-up visit in approximately  4 weeks.    I discussed the need for our program to be able to contact living donors for [employer] reporting purposes for a minimum of 2 years.  Failure of our center to be able to provide such information could jeopardize our ability to continue to offer living donor transplants.  Jo voices understanding and agrees to this follow-up.

## 2020-10-30 NOTE — PROGRESS NOTES
Patient here for first post op appointment s/p kidney donation.  Patient states she is feeling good. No complaints of pain, no longer requiring pain medication.   Appetite is good and bowels are moving without difficulty.   Incisions are healing well without redness or drainage.     Driving and activity restrictions reviewed. Patient encouraged to contact me with any questions or problems. Will return to clinic at 4 weeks with lab.

## 2020-12-10 ENCOUNTER — TELEPHONE (OUTPATIENT)
Dept: TRANSPLANT | Facility: CLINIC | Age: 40
End: 2020-12-10

## 2021-04-28 ENCOUNTER — TELEPHONE (OUTPATIENT)
Dept: TRANSPLANT | Facility: CLINIC | Age: 41
End: 2021-04-28

## 2021-06-15 ENCOUNTER — TELEPHONE (OUTPATIENT)
Dept: TRANSPLANT | Facility: CLINIC | Age: 41
End: 2021-06-15

## 2021-06-22 ENCOUNTER — PATIENT MESSAGE (OUTPATIENT)
Dept: TRANSPLANT | Facility: CLINIC | Age: 41
End: 2021-06-22

## 2021-10-29 ENCOUNTER — TELEPHONE (OUTPATIENT)
Dept: TRANSPLANT | Facility: CLINIC | Age: 41
End: 2021-10-29
Payer: COMMERCIAL

## 2021-12-01 ENCOUNTER — TELEPHONE (OUTPATIENT)
Dept: TRANSPLANT | Facility: CLINIC | Age: 41
End: 2021-12-01
Payer: COMMERCIAL

## 2021-12-01 DIAGNOSIS — Z52.4 KIDNEY DONOR: Primary | ICD-10-CM

## 2022-12-05 ENCOUNTER — PATIENT MESSAGE (OUTPATIENT)
Dept: TRANSPLANT | Facility: CLINIC | Age: 42
End: 2022-12-05

## 2022-12-05 ENCOUNTER — TELEPHONE (OUTPATIENT)
Dept: TRANSPLANT | Facility: CLINIC | Age: 42
End: 2022-12-05

## 2022-12-05 NOTE — TELEPHONE ENCOUNTER
Jo José    12/5[birth year]2022    [id number]    Description: female 1980    Attempt at contact: 2nd    Follow-up Period:2 year    Physical Capacity: No limitations    Working for income: Yes. 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     Date of weight measurement: 11/21/2022    Any ER visits since last contact: No    Any hospitalizations since last contact: No    Any medical issues since last contact: No    Dialysis required: No    Diabetes: No    Any kidney complications: No    Notes: Order sent for lab work on 12/5/2022

## 2022-12-06 LAB
ALBUMIN SERPL-MCNC: 3.7 G/DL (ref 3.5–5)
APPEARANCE UR: CLEAR
BILIRUB UR QL STRIP: NEGATIVE
BUN SERPL-MCNC: 16 MG/DL (ref 5–25)
CALCIUM SERPL-MCNC: 9.1 MG/DL (ref 8.8–10.6)
CHLORIDE SERPL-SCNC: 105 MMOL/L (ref 100–109)
CO2 SERPL-SCNC: 25 MMOL/L (ref 22–33)
COLOR UR: YELLOW
CREAT SERPL-MCNC: 0.84 MG/DL (ref 0.57–1.25)
EST. GFR  (NO RACE VARIABLE): 90
GLUCOSE SERPL-MCNC: 118 MG/DL (ref 70–100)
GLUCOSE UR QL STRIP: NEGATIVE MG/DL
HGB UR QL STRIP: NEGATIVE
KETONES UR QL STRIP: NEGATIVE MG/DL
LEUKOCYTE ESTERASE UR QL STRIP: NEGATIVE
MICRO URNS: NORMAL
NITRITE UR QL STRIP: NEGATIVE
PH UR STRIP: 6.5 [PH] (ref 5–8)
PHOSPHATE SERPL-MCNC: 3.5 MG/DL (ref 2.3–4.7)
POTASSIUM SERPL-SCNC: 4.2 MMOL/L (ref 3.5–5.1)
PROT UR QL STRIP: NEGATIVE MG/DL
SODIUM SERPL-SCNC: 138 MMOL/L (ref 136–145)
SP GR UR STRIP: 1.02
UROBILINOGEN UR STRIP-MCNC: 0.2 E.U./DL (ref 0.1–1)

## 2022-12-07 NOTE — PROGRESS NOTES
Former donor, if this is a fasting test please advise patient to see her PCP to monitor elevated fasting glucose

## (undated) DEVICE — CORD BIPOLAR 12 FOOT

## (undated) DEVICE — SUT 0 36IN COATED VICRYL UN

## (undated) DEVICE — SUT EASE CROSSBOW CLSR SYS

## (undated) DEVICE — DRAPE INCISE IOBAN 2 23X17IN

## (undated) DEVICE — DRAPE ABDOMINAL TIBURON 14X11

## (undated) DEVICE — SOL ELECTROLUBE ANTI-STIC

## (undated) DEVICE — KIT GELPORT LAPAROSCOPIC ABD

## (undated) DEVICE — SEE MEDLINE ITEM 152622

## (undated) DEVICE — ELECTRODE REM PLYHSV RETURN 9

## (undated) DEVICE — APPLIER CLIP ENDO MED/LG 10MM

## (undated) DEVICE — TROCAR ENDOPATH XCEL 5X100MM

## (undated) DEVICE — IRRIGATOR ENDOSCOPY DISP.

## (undated) DEVICE — TROCAR ENDOPATH XCEL 12X100MM

## (undated) DEVICE — CLIP SPRING 6MM

## (undated) DEVICE — DRAPE SCOPE PILLOW WARMER

## (undated) DEVICE — ADHESIVE DERMABOND ADVANCED

## (undated) DEVICE — SOL NS 1000CC

## (undated) DEVICE — APPLICATOR CHLORAPREP ORN 26ML

## (undated) DEVICE — SUT 1 36IN PDS II VIO MONO

## (undated) DEVICE — SUT PROLENE 6-0 BV-1 30IN

## (undated) DEVICE — COVER LIGHT HANDLE

## (undated) DEVICE — DRAPE SLUSH WARMER WITH DISC

## (undated) DEVICE — NDL 22GA X1 1/2 REG BEVEL

## (undated) DEVICE — STAPLER INT LINEAR ARTC 3.5-45

## (undated) DEVICE — SUT 4-0 12-18IN SILK BLACK

## (undated) DEVICE — SUT 2/0 30IN SILK BLK BRAI

## (undated) DEVICE — SET DECANTER MEDICHOICE

## (undated) DEVICE — SET IRR URLGY 2LINE UNIV SPIKE

## (undated) DEVICE — DRAPE BAG ISOLATION 20 X 20

## (undated) DEVICE — SPONGE LAP 18X18 PREWASHED

## (undated) DEVICE — CART STAPLE RELD 45MM WHT

## (undated) DEVICE — KIT ROBOTIC 3 ARM DA VINCI SI

## (undated) DEVICE — GOWN SURGICAL X-LARGE

## (undated) DEVICE — PACK SET UP CONVERTORS

## (undated) DEVICE — COVER TIP CURVED SCISSORS XI

## (undated) DEVICE — TRAY MINOR GEN SURG

## (undated) DEVICE — TUBE SET SINGLE LUMEN FILTERED

## (undated) DEVICE — HOLDER TUBE

## (undated) DEVICE — SUT 2-0 12-18IN SILK

## (undated) DEVICE — SUT MCRYL PLUS 4-0 PS2 27IN

## (undated) DEVICE — SUT 3-0 12-18IN SILK

## (undated) DEVICE — KIT ANTIFOG

## (undated) DEVICE — TRAY FOLEY 16FR INFECTION CONT

## (undated) DEVICE — TROCAR SPACEMAKER BLUNT 12MM